# Patient Record
Sex: FEMALE | Race: WHITE | Employment: FULL TIME | URBAN - METROPOLITAN AREA
[De-identification: names, ages, dates, MRNs, and addresses within clinical notes are randomized per-mention and may not be internally consistent; named-entity substitution may affect disease eponyms.]

---

## 2017-02-27 ENCOUNTER — TRANSFERRED RECORDS (OUTPATIENT)
Dept: HEALTH INFORMATION MANAGEMENT | Facility: CLINIC | Age: 42
End: 2017-02-27

## 2017-03-02 ENCOUNTER — OFFICE VISIT (OUTPATIENT)
Dept: URGENT CARE | Facility: URGENT CARE | Age: 42
End: 2017-03-02
Payer: COMMERCIAL

## 2017-03-02 VITALS
WEIGHT: 198 LBS | OXYGEN SATURATION: 100 % | TEMPERATURE: 98.5 F | BODY MASS INDEX: 30.55 KG/M2 | SYSTOLIC BLOOD PRESSURE: 122 MMHG | HEART RATE: 102 BPM | DIASTOLIC BLOOD PRESSURE: 88 MMHG

## 2017-03-02 DIAGNOSIS — R10.84 ABDOMINAL PAIN, GENERALIZED: Primary | ICD-10-CM

## 2017-03-02 LAB
BASOPHILS # BLD AUTO: 0 10E9/L (ref 0–0.2)
BASOPHILS NFR BLD AUTO: 0.3 %
DIFFERENTIAL METHOD BLD: NORMAL
EOSINOPHIL # BLD AUTO: 0.1 10E9/L (ref 0–0.7)
EOSINOPHIL NFR BLD AUTO: 1.8 %
ERYTHROCYTE [DISTWIDTH] IN BLOOD BY AUTOMATED COUNT: 13.7 % (ref 10–15)
HCT VFR BLD AUTO: 40.6 % (ref 35–47)
HGB BLD-MCNC: 13.2 G/DL (ref 11.7–15.7)
LYMPHOCYTES # BLD AUTO: 1.5 10E9/L (ref 0.8–5.3)
LYMPHOCYTES NFR BLD AUTO: 19.6 %
MCH RBC QN AUTO: 28.4 PG (ref 26.5–33)
MCHC RBC AUTO-ENTMCNC: 32.5 G/DL (ref 31.5–36.5)
MCV RBC AUTO: 88 FL (ref 78–100)
MONOCYTES # BLD AUTO: 0.7 10E9/L (ref 0–1.3)
MONOCYTES NFR BLD AUTO: 9.6 %
NEUTROPHILS # BLD AUTO: 5.3 10E9/L (ref 1.6–8.3)
NEUTROPHILS NFR BLD AUTO: 68.7 %
PLATELET # BLD AUTO: 285 10E9/L (ref 150–450)
RBC # BLD AUTO: 4.64 10E12/L (ref 3.8–5.2)
WBC # BLD AUTO: 7.7 10E9/L (ref 4–11)

## 2017-03-02 PROCEDURE — 99214 OFFICE O/P EST MOD 30 MIN: CPT | Performed by: FAMILY MEDICINE

## 2017-03-02 PROCEDURE — 85025 COMPLETE CBC W/AUTO DIFF WBC: CPT | Performed by: FAMILY MEDICINE

## 2017-03-02 PROCEDURE — 36415 COLL VENOUS BLD VENIPUNCTURE: CPT | Performed by: FAMILY MEDICINE

## 2017-03-02 RX ORDER — SUCRALFATE 1 G/1
1 TABLET ORAL 4 TIMES DAILY
Qty: 30 TABLET | Refills: 0 | Status: ON HOLD | OUTPATIENT
Start: 2017-03-02 | End: 2017-04-23

## 2017-03-02 RX ORDER — HYDROCODONE BITARTRATE AND ACETAMINOPHEN 5; 325 MG/1; MG/1
1-2 TABLET ORAL
Status: ON HOLD | COMMUNITY
Start: 2017-02-27 | End: 2017-04-23

## 2017-03-02 NOTE — NURSING NOTE
"Chief Complaint   Patient presents with     Urgent Care     Abdominal Pain     Was seen in [facility] on 2/27 with all over stomach pain. Had CT and labs done that were all normal. Pt is still having 6/10 pain.        Initial /88  Pulse 102  Temp 98.5  F (36.9  C) (Tympanic)  Wt 198 lb (89.8 kg)  SpO2 100%  BMI 30.55 kg/m2 Estimated body mass index is 30.55 kg/(m^2) as calculated from the following:    Height as of 12/30/16: 5' 7.5\" (1.715 m).    Weight as of this encounter: 198 lb (89.8 kg).  Medication Reconciliation: complete    Sandra Pool   "

## 2017-03-02 NOTE — MR AVS SNAPSHOT
After Visit Summary   3/2/2017    Leyla Hines    MRN: [id number]           Patient Information     Date Of Birth          1975        Visit Information        Provider Department      3/2/2017 4:30 PM Corbin Gaspar MD [facility]        Today's Diagnoses     Abdominal pain, generalized    -  1      Care Instructions      *Abdominal Pain, Unknown Cause (Female)    The exact cause of your abdominal (stomach) pain is not certain. This does not mean that this is something to worry about, or the right tests were not done. Everyone likes to know the exact cause of the problem, but sometimes with abdominal pain, there is no clear-cut cause, and this could be a good thing. The good news is that your symptoms can be treated, and you will feel better.   Your condition does not seem serious now; however, sometimes the signs of a serious problem may take more time to appear. For this reason, it is important for you to watch for any new symptoms, problems, or worsening of your condition.  Over the next few days, the abdominal pain may come and go, or be continuous. Other common symptoms can include nausea and vomiting. Sometimes it can be difficult to tell if you feel nauseous, you may just feel bad and not associate that feeling with nausea. Constipation, diarrhea, and a fever may go along with the pain.  The pain may continue even if treated correctly over the following days. Depending on how things go, sometimes the cause can become clear and may require further or different treatment. Additional evaluations, medications, or tests may be needed.  Home care  Your health care provider may prescribe medications for pain, symptoms, or an infection.  Follow the health care provider's instructions for taking these medications.  General care    Rest until your next exam. No strenuous activities.    Try to find positions that ease discomfort. A small pillow placed on the abdomen may help relieve  pain.    Something warm on your abdomen (such as a heating pad) may help, but be careful not to burn yourself.  Diet    Do not force yourself to eat, especially if having cramps, vomiting, or diarrhea.    Water is important so you do not get dehydrated. Soup may also be good. Sports drinks may also help, especially if they are not too acidic. Make sure you don't drink sugary drinks as this can make things worse. Take liquids in small amounts. Do not guzzle them.    Caffeine sometimes makes the pain and cramping worse.    Avoid dairy products if you have vomiting or diarrhea.    Don't eat large amounts at a time. Wait a few minutes between bites.    Eat a diet low in fiber (called a low-residue diet). Foods allowed include refined breads, white rice, fruit and vegetable juices without pulp, tender meats. These foods will pass more easily through the intestine.    Avoid fried or fatty foods, dairy, alcohol and spicy foods until your symptoms go away.  Follow-up care  Follow up with your health care provider as instructed, or if your pain does not begin to improve in the next 24 hours.  When to seek medical care  Seek prompt medical care if any of the following occur:    Pain gets worse or moves to the right lower abdomen    New or worsening vomiting or diarrhea    Swelling of the abdomen    Unable to pass stool for more than three days    New fever over 101  F (38.3 C), or rising fever    Blood in vomit or bowel movements (dark red or black color)    Jaundice (yellow color of eyes and skin)    Weakness, dizziness    Chest, arm, back, neck or jaw pain    Unexpected vaginal bleeding or missed period  Call 911  Call emergency services if any of the following occur:    Trouble breathing    Confusion    Fainting or loss of consciousness    Rapid heart rate    Seizure    [id number] [facility], [address] [place] [address], [place] [address]. All rights reserved. This information is not intended as a substitute for  professional medical care. Always follow your healthcare professional's instructions.        What are Gallstones?  The gallbladder stores bile, a fluid made by the liver. Bile helps digest fats in the foods you eat. Gallstones form when certain substances in the bile crystallize and become solid. In some cases, the stones don t cause any symptoms. In others, they irritate the walls of the gallbladder. More serious problems can occur if stones move into nearby ducts--such as the common bile duct--and cause blockages. This can block the flow of bile and can lead to pain, nausea, and infection.    Common symptoms  Gallbladder problems can cause painful attacks, often after a meal. Some people have only one attack. Others have many. Common symptoms include:    Severe, steady pain or aching in the upper right abdomen, back, or right shoulder blade, lasting from 30 minutes to several hours    A dull ache beneath the ribs or breastbone    Nausea, upset stomach, or vomiting    Jaundice (a buildup of bile chemicals in the blood), which causes yellowing of the skin and eyes, dark urine, and itching. Call your doctor immediately if this system develops.  Treating gallstones  If your stones are not causing symptoms, you may choose to delay treatment. But if you ve had one or more painful attacks, your doctor will likely recommend removing your gallbladder. This prevents more stones from forming and causing attacks. It also helps prevent complications, such as stones passing into the ducts and causing infection or pancreatitis. After the gallbladder is removed, your liver will still make bile to aid digestion.     If you re pregnant  Hormone changes during pregnancy can make bile more likely to form stones. If your gallbladder needs to be removed, your doctor will talk with you about the timing for surgery. In some cases, it can be delayed until after childbirth. In others, you may have surgery during pregnancy. This helps  "protect you and your baby s health.        9602-8638 [employer]. [address] [place] [address], [place] [address]. All rights reserved. This information is not intended as a substitute for professional medical care. Always follow your healthcare professional's instructions.              Follow-ups after your visit        Future tests that were ordered for you today     Open Future Orders        Priority Expected Expires Ordered    H Pylori antigen, stool Routine  2017 3/2/2017            Who to contact     If you have questions or need follow up information about today's clinic visit or your schedule please contact [facility] directly at [phone].  Normal or non-critical lab and imaging results will be communicated to you by Ideaxishart, letter or phone within 4 business days after the clinic has received the results. If you do not hear from us within 7 days, please contact the clinic through Ideaxishart or phone. If you have a critical or abnormal lab result, we will notify you by phone as soon as possible.  Submit refill requests through Netseer or call your pharmacy and they will forward the refill request to us. Please allow 3 business days for your refill to be completed.          Additional Information About Your Visit        Ideaxishar[employer] Information     Netseer lets you send messages to your doctor, view your test results, renew your prescriptions, schedule appointments and more. To sign up, go to [email or web address] . Click on \"Log in\" on the left side of the screen, which will take you to the Welcome page. Then click on \"Sign up Now\" on the right side of the page.     You will be asked to enter the access code listed below, as well as some personal information. Please follow the directions to create your username and password.     Your access code is: [email or web address]  Expires: 2017  5:23 PM     Your access code will  in 90 days. If you need help or a new code, please call your " [facility] or [phone].        Care EveryWhere ID     This is your Care EveryWhere ID. This could be used by other organizations to access your [facility] medical records  [id number]        Your Vitals Were     Pulse Temperature Pulse Oximetry BMI (Body Mass Index)          102 98.5  F (36.9  C) (Tympanic) 100% 30.55 kg/m2         Blood Pressure from Last 3 Encounters:   03/02/17 122/88   12/30/16 112/78   11/25/16 122/72    Weight from Last 3 Encounters:   03/02/17 198 lb (89.8 kg)   12/30/16 180 lb (81.6 kg)   11/25/16 200 lb 4.8 oz (90.9 kg)              We Performed the Following     CBC with platelets and differential          Today's Medication Changes          These changes are accurate as of: 3/2/17  5:23 PM.  If you have any questions, ask your nurse or doctor.               Start taking these medicines.        Dose/Directions    sucralfate 1 GM tablet   Commonly known as:  CARAFATE   Used for:  Abdominal pain, generalized   Started by:  Corbin Gaspar MD        Dose:  1 g   Take 1 tablet (1 g) by mouth 4 times daily   Quantity:  30 tablet   Refills:  0            Where to get your medicines      [facility] [address] [place], [place] [address] AT [address]  [address] [facility] [address]     Phone:  [phone]     sucralfate 1 GM tablet                Primary Care Provider Office Phone # Fax #    REESE Sylvester [phone]       [facility] [place] [address] [place] [address]        Thank you!     Thank you for choosing [facility]  for your care. Our goal is always to provide you with excellent care. Hearing back from our patients is one way we can continue to improve our services. Please take a few minutes to complete the written survey that you may receive in the mail after your visit with us. Thank you!             Your Updated Medication  List - Protect others around you: Learn how to safely use, store and throw away your medicines at www.disposemymeds.org.          This list is accurate as of: 3/2/17  5:23 PM.  Always use your most recent med list.                   Brand Name Dispense Instructions for use    HYDROcodone-acetaminophen 5-325 MG per tablet    NORCO     Take 1-2 tablets by mouth       multivitamin, therapeutic with minerals Tabs tablet      Take 1 tablet by mouth daily.       sucralfate 1 GM tablet    CARAFATE    30 tablet    Take 1 tablet (1 g) by mouth 4 times daily

## 2017-03-02 NOTE — PROGRESS NOTES
SUBJECTIVE  HPI:  Leyla Hines is a 41 year old female who presents with the CC of abdominal pain.    Patient complain of abdominal pain for the past 5 days, woke up with pain.  Pain in epigastric and periumbilical, diffusely.  Patient states that is constant ache and fullness.  Patient was seen in [facility] on Monday (the following day) and had CT scan and labs and was told normal.  Patient states that new change in symptoms is that stools are more light tan yellowish color, loose.  Denies any vomiting.  Rates pain 5/10.    Patient underwent colonoscopy in December due to family history of colon cancer before age 50 and was normal.  Denies any recent travel, no changes in food.    Patient states that has tried zantac and prilosec without relief.  Still has appendix and gallbladder.      No past medical history on file.  Current Outpatient Prescriptions   Medication Sig Dispense Refill     HYDROcodone-acetaminophen (NORCO) 5-325 MG per tablet Take 1-2 tablets by mouth       multivitamin, therapeutic with minerals (THERA-VIT-M) TABS Take 1 tablet by mouth daily.       Social History   Substance Use Topics     Smoking status: Never Smoker     Smokeless tobacco: Never Used     Alcohol use No       ROS:  CONSTITUTIONAL:NEGATIVE for fever, chills, change in weight  INTEGUMENTARY/SKIN: NEGATIVE for worrisome rashes, moles or lesions  ENT/MOUTH: NEGATIVE for ear, mouth and throat problems  RESP:NEGATIVE for significant cough or SOB  CV: NEGATIVE for chest pain, palpitations or peripheral edema  GI: POSITIVE for abdominal pain  : negative for, dysuria and frequency   MUSCULOSKELETAL: NEGATIVE for significant arthralgias or myalgia    OBJECTIVE:  /88  Pulse 102  Temp 98.5  F (36.9  C) (Tympanic)  Wt 198 lb (89.8 kg)  SpO2 100%  BMI 30.55 kg/m2  GENERAL APPEARANCE: healthy, alert and no distress  EYES: EOMI,  PERRL, conjunctiva clear  HENT: ear canals and TM's normal.  Nose and mouth without ulcers, erythema or  lesions  NECK: supple, mild tenderness in RUQ, RLQ, epigastric, no rebound, no guarding, no lymphadenopathy  RESP: lungs clear to auscultation - no rales, rhonchi or wheezes  CV: regular rates and rhythm, normal S1 S2, no murmur noted  ABDOMEN:  soft, nontender, no HSM or masses and bowel sounds normal  NEURO: Normal strength and tone, sensory exam grossly normal,  normal speech and mentation  SKIN: no suspicious lesions or rashes  PSYCH: mentation appears normal and affect normal/bright    Results for orders placed or performed in visit on 03/02/17   CBC with platelets and differential   Result Value Ref Range    WBC 7.7 4.0 - 11.0 10e9/L    RBC Count 4.64 3.8 - 5.2 10e12/L    Hemoglobin 13.2 11.7 - 15.7 g/dL    Hematocrit 40.6 35.0 - 47.0 %    MCV 88 78 - 100 fl    MCH 28.4 26.5 - 33.0 pg    MCHC 32.5 31.5 - 36.5 g/dL    RDW 13.7 10.0 - 15.0 %    Platelet Count 285 150 - 450 10e9/L    Diff Method Automated Method     % Neutrophils 68.7 %    % Lymphocytes 19.6 %    % Monocytes 9.6 %    % Eosinophils 1.8 %    % Basophils 0.3 %    Absolute Neutrophil 5.3 1.6 - 8.3 10e9/L    Absolute Lymphocytes 1.5 0.8 - 5.3 10e9/L    Absolute Monocytes 0.7 0.0 - 1.3 10e9/L    Absolute Eosinophils 0.1 0.0 - 0.7 10e9/L    Absolute Basophils 0.0 0.0 - 0.2 10e9/L       ASSESSMENT/PLAN:  (R10.84) Abdominal pain, generalized  (primary encounter diagnosis)  Comment: unclear  Plan: CBC with platelets and differential, H Pylori         antigen, stool, sucralfate (CARAFATE) 1 GM         tablet            Reviewed with patient reassuring CBC results, discussed that most likely will require further workup thru primary provider.  Discussed possible etiology for abdominal pain, does not appear in acute distress and abdomen is not acute surgical at this time.  Will obtain H.pylori stool testing.  Trial of sucralfate to see if this will help possible gastritis.  To ER if develops worsening abdominal pain.    Follow up with primary in 1-2  days.    Corbin Gaspar MD

## 2017-03-02 NOTE — PATIENT INSTRUCTIONS
*Abdominal Pain, Unknown Cause (Female)    The exact cause of your abdominal (stomach) pain is not certain. This does not mean that this is something to worry about, or the right tests were not done. Everyone likes to know the exact cause of the problem, but sometimes with abdominal pain, there is no clear-cut cause, and this could be a good thing. The good news is that your symptoms can be treated, and you will feel better.   Your condition does not seem serious now; however, sometimes the signs of a serious problem may take more time to appear. For this reason, it is important for you to watch for any new symptoms, problems, or worsening of your condition.  Over the next few days, the abdominal pain may come and go, or be continuous. Other common symptoms can include nausea and vomiting. Sometimes it can be difficult to tell if you feel nauseous, you may just feel bad and not associate that feeling with nausea. Constipation, diarrhea, and a fever may go along with the pain.  The pain may continue even if treated correctly over the following days. Depending on how things go, sometimes the cause can become clear and may require further or different treatment. Additional evaluations, medications, or tests may be needed.  Home care  Your health care provider may prescribe medications for pain, symptoms, or an infection.  Follow the health care provider's instructions for taking these medications.  General care    Rest until your next exam. No strenuous activities.    Try to find positions that ease discomfort. A small pillow placed on the abdomen may help relieve pain.    Something warm on your abdomen (such as a heating pad) may help, but be careful not to burn yourself.  Diet    Do not force yourself to eat, especially if having cramps, vomiting, or diarrhea.    Water is important so you do not get dehydrated. Soup may also be good. Sports drinks may also help, especially if they are not too acidic. Make sure you  don't drink sugary drinks as this can make things worse. Take liquids in small amounts. Do not guzzle them.    Caffeine sometimes makes the pain and cramping worse.    Avoid dairy products if you have vomiting or diarrhea.    Don't eat large amounts at a time. Wait a few minutes between bites.    Eat a diet low in fiber (called a low-residue diet). Foods allowed include refined breads, white rice, fruit and vegetable juices without pulp, tender meats. These foods will pass more easily through the intestine.    Avoid fried or fatty foods, dairy, alcohol and spicy foods until your symptoms go away.  Follow-up care  Follow up with your health care provider as instructed, or if your pain does not begin to improve in the next 24 hours.  When to seek medical care  Seek prompt medical care if any of the following occur:    Pain gets worse or moves to the right lower abdomen    New or worsening vomiting or diarrhea    Swelling of the abdomen    Unable to pass stool for more than three days    New fever over 101  F (38.3 C), or rising fever    Blood in vomit or bowel movements (dark red or black color)    Jaundice (yellow color of eyes and skin)    Weakness, dizziness    Chest, arm, back, neck or jaw pain    Unexpected vaginal bleeding or missed period  Call 911  Call emergency services if any of the following occur:    Trouble breathing    Confusion    Fainting or loss of consciousness    Rapid heart rate    Seizure    [id number] [employer] [facility], [address] [place] [address], [place] [address]. All rights reserved. This information is not intended as a substitute for professional medical care. Always follow your healthcare professional's instructions.        What are Gallstones?  The gallbladder stores bile, a fluid made by the liver. Bile helps digest fats in the foods you eat. Gallstones form when certain substances in the bile crystallize and become solid. In some cases, the stones don t cause any symptoms. In others, they  irritate the walls of the gallbladder. More serious problems can occur if stones move into nearby ducts--such as the common bile duct--and cause blockages. This can block the flow of bile and can lead to pain, nausea, and infection.    Common symptoms  Gallbladder problems can cause painful attacks, often after a meal. Some people have only one attack. Others have many. Common symptoms include:    Severe, steady pain or aching in the upper right abdomen, back, or right shoulder blade, lasting from 30 minutes to several hours    A dull ache beneath the ribs or breastbone    Nausea, upset stomach, or vomiting    Jaundice (a buildup of bile chemicals in the blood), which causes yellowing of the skin and eyes, dark urine, and itching. Call your doctor immediately if this system develops.  Treating gallstones  If your stones are not causing symptoms, you may choose to delay treatment. But if you ve had one or more painful attacks, your doctor will likely recommend removing your gallbladder. This prevents more stones from forming and causing attacks. It also helps prevent complications, such as stones passing into the ducts and causing infection or pancreatitis. After the gallbladder is removed, your liver will still make bile to aid digestion.     If you re pregnant  Hormone changes during pregnancy can make bile more likely to form stones. If your gallbladder needs to be removed, your doctor will talk with you about the timing for surgery. In some cases, it can be delayed until after childbirth. In others, you may have surgery during pregnancy. This helps protect you and your baby s health.        [id number] [employer]. [address] [place] [address], [place] [address]. All rights reserved. This information is not intended as a substitute for professional medical care. Always follow your healthcare professional's instructions.

## 2017-03-03 ENCOUNTER — HOSPITAL ENCOUNTER (OUTPATIENT)
Dept: ULTRASOUND IMAGING | Facility: CLINIC | Age: 42
Discharge: HOME | End: 2017-03-03
Attending: INTERNAL MEDICINE | Admitting: INTERNAL MEDICINE
Payer: COMMERCIAL

## 2017-03-03 ENCOUNTER — OFFICE VISIT (OUTPATIENT)
Dept: PEDIATRICS | Facility: CLINIC | Age: 42
End: 2017-03-03
Payer: COMMERCIAL

## 2017-03-03 VITALS
TEMPERATURE: 98.3 F | HEART RATE: 98 BPM | WEIGHT: 194.44 LBS | RESPIRATION RATE: 16 BRPM | HEIGHT: 67 IN | OXYGEN SATURATION: 100 % | DIASTOLIC BLOOD PRESSURE: 86 MMHG | SYSTOLIC BLOOD PRESSURE: 120 MMHG | BODY MASS INDEX: 30.52 KG/M2

## 2017-03-03 DIAGNOSIS — R10.84 ABDOMINAL PAIN, GENERALIZED: ICD-10-CM

## 2017-03-03 DIAGNOSIS — R10.84 ABDOMINAL PAIN, GENERALIZED: Primary | ICD-10-CM

## 2017-03-03 LAB — LIPASE SERPL-CCNC: 73 U/L (ref 73–393)

## 2017-03-03 PROCEDURE — 80053 COMPREHEN METABOLIC PANEL: CPT | Performed by: INTERNAL MEDICINE

## 2017-03-03 PROCEDURE — 36415 COLL VENOUS BLD VENIPUNCTURE: CPT | Performed by: INTERNAL MEDICINE

## 2017-03-03 PROCEDURE — 99214 OFFICE O/P EST MOD 30 MIN: CPT | Performed by: INTERNAL MEDICINE

## 2017-03-03 PROCEDURE — 76700 US EXAM ABDOM COMPLETE: CPT

## 2017-03-03 PROCEDURE — 83690 ASSAY OF LIPASE: CPT | Performed by: INTERNAL MEDICINE

## 2017-03-03 NOTE — LETTER
[facility]  [address] [address]                  [phone]   March 6, 2017    Leyla Hines  [address] [place] [id number]      Dear Leyla,    Here is a summary of your recent test results:    Your repeat electrolytes, liver, and kidney panels and you pancreas test looked great.       Your test results are enclosed.      Please contact me if you have any questions.           Thank you very much for choosing [facility]    Best regards,    Yaakov Stephens MD        Results for orders placed or performed in visit on 03/03/17   Comprehensive metabolic panel (BMP + Alb, Alk Phos, ALT, AST, Total. Bili, TP)   Result Value Ref Range    Sodium 139 133 - 144 mmol/L    Potassium 3.8 3.4 - 5.3 mmol/L    Chloride 105 94 - 109 mmol/L    Carbon Dioxide 27 20 - 32 mmol/L    Anion Gap 7 3 - 14 mmol/L    Glucose 88 70 - 99 mg/dL    Urea Nitrogen 6 (L) 7 - 30 mg/dL    Creatinine 0.62 0.52 - 1.04 mg/dL    GFR Estimate >90  Non  GFR Calc   >60 mL/min/1.7m2    GFR Estimate If Black >90   GFR Calc   >60 mL/min/1.7m2    Calcium 8.9 8.5 - 10.1 mg/dL    Bilirubin Total 0.3 0.2 - 1.3 mg/dL    Albumin 3.8 3.4 - 5.0 g/dL    Protein Total 7.4 6.8 - 8.8 g/dL    Alkaline Phosphatase 67 40 - 150 U/L    ALT 54 (H) 0 - 50 U/L    AST 23 0 - 45 U/L   Lipase   Result Value Ref Range    Lipase 73 73 - 393 U/L

## 2017-03-03 NOTE — PATIENT INSTRUCTIONS
Clear liquid diet for the next 3 days.    Get ultrasound today hopefully.    Conisder an upper scope the ultrasound is negative.    Lab work downstairs today.  (Go down the main stairs/elevator and re-enter the main part of the building on the first floor.  On the right hand side--with striped wallpaper--is the lab and xray waiting area. Give them your name at the window there and wait for them to call your name.)     Yaakov Stephens MD  Internal Medicine and Pediatrics

## 2017-03-03 NOTE — PROGRESS NOTES
"  SUBJECTIVE:                                                    Leyla Hines is a 41 year old female who presents to clinic today for the following health issues:      ED/UC Followup:    Facility:  [facility]   Date of visit: 3/2/17  Reason for visit: abdominal pain   Current Status: Pt states \"today has been ok\" pain level is at 4/10 currently, at worst 8/10.        Began about 5 days ago with intense pain and fullness in central abdomen.  Had also just started menses at that point, so assumed it was this; too ibuprofen.  Went to urgent care and had a CT, blood work, all within normal limits could not see much on CT:  Given proton pump inhibitor and H2 blocker.   Watched it for a few days and then yesterday seen at our urgent care.      Since  when went to urgency room, stools have been light tan;   Overall, symptoms are better than , but still not good.      Symptoms are always present.  No vomiting, but some nausea. No fevers.  No sore throat, no earache, no headache.      Symptoms do worsen some after eating, but has not been eating much, due to lack of appetite.     Problem list and histories reviewed & adjusted, as indicated.  Additional history: as documented    Patient Active Problem List   Diagnosis     PE (pulmonary embolism)     Past Surgical History   Procedure Laterality Date      section  02/10/07     high BP      section  09     repeat      section  2011     Procedure: SECTION; Surgeon:ANDREI RUSSELL; Location: L+D     Colonoscopy N/A 2016     Procedure: COLONOSCOPY;  Surgeon: Lester Looney MD;  Location:  GI       Social History   Substance Use Topics     Smoking status: Never Smoker     Smokeless tobacco: Never Used     Alcohol use No     Family History   Problem Relation Age of Onset     Hyperlipidemia Mother      Other Cancer Mother      lung cancer cause of death at 63     Thyroid Disease Mother      hyperthyroidism     " "OSTEOPOROSIS Mother      Coronary Artery Disease Father      Myocardial Infarction Father      Colon Cancer Father      about age 54     Other Cancer Father      esophageal cause of death at 67     Hypertension Father      Hyperlipidemia Father      Hypertension Brother      Asthma Brother      Colon Cancer Brother      2015     Emphysema Maternal Grandmother      Other Cancer Maternal Grandfather      lymphoma cause of death at age 54     Breast Cancer Paternal Grandmother      in 80s     Alzheimer Disease Paternal Grandmother      DIABETES Paternal Grandfather      Colon Cancer Paternal Grandfather      dx in 80s     Colon Cancer Paternal Uncle          Current Outpatient Prescriptions   Medication Sig Dispense Refill     HYDROcodone-acetaminophen (NORCO) 5-325 MG per tablet Take 1-2 tablets by mouth       sucralfate (CARAFATE) 1 GM tablet Take 1 tablet (1 g) by mouth 4 times daily 30 tablet 0     multivitamin, therapeutic with minerals (THERA-VIT-M) TABS Take 1 tablet by mouth daily.       Allergies   Allergen Reactions     Betadine [Povidone Iodine]      BP Readings from Last 3 Encounters:   03/03/17 120/86   03/02/17 122/88   12/30/16 112/78    Wt Readings from Last 3 Encounters:   03/03/17 194 lb 7 oz (88.2 kg)   03/02/17 198 lb (89.8 kg)   12/30/16 180 lb (81.6 kg)                  Labs reviewed in EPIC    Reviewed and updated as needed this visit by clinical staff       Reviewed and updated as needed this visit by Provider         ROS:  C: NEGATIVE for fever, chills, change in weight  E/M: NEGATIVE for ear, mouth and throat problems  R: NEGATIVE for significant cough or SOB  CV: NEGATIVE for chest pain, palpitations or peripheral edema    OBJECTIVE:                                                    /86 (BP Location: Right arm, Patient Position: Chair, Cuff Size: Adult Regular)  Pulse 98  Temp 98.3  F (36.8  C) (Tympanic)  Resp 16  Ht 5' 7.25\" (1.708 m)  Wt 194 lb 7 oz (88.2 kg)  SpO2 100%  " Breastfeeding? No  BMI 30.23 kg/m2  Body mass index is 30.23 kg/(m^2).   GENERAL: healthy, alert, well nourished, well hydrated, no distress  HENT: ear canals- normal; TMs- normal; Nose- normal; Mouth- no ulcers, no lesions  NECK: no tenderness, no adenopathy, no asymmetry, no masses, no stiffness; thyroid- normal to palpation  RESP: lungs clear to auscultation - no rales, no rhonchi, no wheezes  CV: regular rates and rhythm, normal S1 S2, no S3 or S4 and no murmur, no click or rub -  ABDOMEN: mild tenderness, but very soft no  hepatosplenomegaly, no masses, normal bowel sounds    Diagnostic test results:  Diagnostic Test Results:  none      ASSESSMENT/PLAN:                                                    1. Abdominal pain, generalized  Has already had a negative CT, electrolytes, liver, and kidney panels, and lipase.  complete blood count has returned to normal. Proceed with sono abd for ruling out gall bladder obstruction.  If negative, and if symptoms persist, would then need an esophagogastroduodenoscopy.  Continue proton pump inhibitor for now.   - US Abdomen Complete; Future  - Comprehensive metabolic panel (BMP + Alb, Alk Phos, ALT, AST, Total. Bili, TP)  - Lipase    E4: Spent 25 minutes face to face.     More than 50% of the time was spent in counseling and coordination of care of these issues.   See Patient Instructions    Yaakov Stephens MD  [facility]

## 2017-03-03 NOTE — MR AVS SNAPSHOT
After Visit Summary   3/3/2017    Leyla Hines    MRN: [id number]           Patient Information     Date Of Birth          1975        Visit Information        Provider Department      3/3/2017 11:20 AM [facility]akov [facility] MD [facility]        Today's Diagnoses     Abdominal pain, generalized    -  1      Care Instructions    Clear liquid diet for the next 3 days.    Get ultrasound today hopefully.    Conisder an upper scope the ultrasound is negative.    Lab work downstairs today.  (Go down the main stairs/elevator and re-enter the main part of the building on the first floor.  On the right hand side--with striped wallpaper--is the lab and xray waiting area. Give them your name at the window there and wait for them to call your name.)     Yaakov Stephens MD  Internal Medicine and Pediatrics           Follow-ups after your visit        Future tests that were ordered for you today     Open Future Orders        Priority Expected Expires Ordered    US Abdomen Complete Routine  3/3/2018 3/3/2017    H Pylori antigen, stool Routine  4/1/2017 3/2/2017            Who to contact     If you have questions or need follow up information about today's clinic visit or your schedule please contact [facility] directly at [phone].  Normal or non-critical lab and imaging results will be communicated to you by MyChart, letter or phone within 4 business days after the clinic has received the results. If you do not hear from us within 7 days, please contact the clinic through [employer] or phone. If you have a critical or abnormal lab result, we will notify you by phone as soon as possible.  Submit refill requests through [employer] or call your pharmacy and they will forward the refill request to us. Please allow 3 business days for your refill to be completed.          Additional Information About Your Visit        [employer] Some Style Information     [employer] lets you send messages to your doctor, view your test  "results, renew your prescriptions, schedule appointments and more. To sign up, go to [email or web address] . Click on \"Log in\" on the left side of the screen, which will take you to the Welcome page. Then click on \"Sign up Now\" on the right side of the page.     You will be asked to enter the access code listed below, as well as some personal information. Please follow the directions to create your username and password.     Your access code is: [email or web address]  Expires: 2017  5:23 PM     Your access code will  in 90 days. If you need help or a new code, please call your [place] clinic or [phone].        Care EveryWhere ID     This is your Care EveryWhere ID. This could be used by other organizations to access your [place] medical records  [id number]        Your Vitals Were     Pulse Temperature Respirations Height Pulse Oximetry Breastfeeding?    98 98.3  F (36.8  C) (Tympanic) 16 5' 7.25\" (1.708 m) 100% No    BMI (Body Mass Index)                   30.23 kg/m2            Blood Pressure from Last 3 Encounters:   17 120/86   17 122/88   16 112/78    Weight from Last 3 Encounters:   17 194 lb 7 oz (88.2 kg)   17 198 lb (89.8 kg)   16 180 lb (81.6 kg)              We Performed the Following     Comprehensive metabolic panel (BMP + Alb, Alk Phos, ALT, AST, Total. Bili, TP)     Lipase        Primary Care Provider Office Phone # Fax #    [facility] [phone]       [facility] [address] [place] [address]        Thank you!     Thank you for choosing [facility]  for your care. Our goal is always to provide you with excellent care. Hearing back from our patients is one way we can continue to improve our services. Please take a few minutes to complete the written survey that you may receive in the mail after your visit with us. Thank you!             Your Updated Medication List - Protect others " around you: Learn how to safely use, store and throw away your medicines at www.disposemymeds.org.          This list is accurate as of: 3/3/17 11:45 AM.  Always use your most recent med list.                   Brand Name Dispense Instructions for use    HYDROcodone-acetaminophen 5-325 MG per tablet    NORCO     Take 1-2 tablets by mouth       multivitamin, therapeutic with minerals Tabs tablet      Take 1 tablet by mouth daily.       sucralfate 1 GM tablet    CARAFATE    30 tablet    Take 1 tablet (1 g) by mouth 4 times daily

## 2017-03-03 NOTE — NURSING NOTE
"Chief Complaint   Patient presents with     ER F/U       Initial /86 (BP Location: Right arm, Patient Position: Chair, Cuff Size: Adult Regular)  Pulse 98  Temp 98.3  F (36.8  C) (Tympanic)  Resp 16  Ht 5' 7.25\" (1.708 m)  Wt 194 lb 7 oz (88.2 kg)  SpO2 100%  Breastfeeding? No  BMI 30.23 kg/m2 Estimated body mass index is 30.23 kg/(m^2) as calculated from the following:    Height as of this encounter: 5' 7.25\" (1.708 m).    Weight as of this encounter: 194 lb 7 oz (88.2 kg).  Medication Reconciliation: complete     Verna James MA 3/3/2017 11:28 AM    "

## 2017-03-04 LAB
ALBUMIN SERPL-MCNC: 3.8 G/DL (ref 3.4–5)
ALP SERPL-CCNC: 67 U/L (ref 40–150)
ALT SERPL W P-5'-P-CCNC: 54 U/L (ref 0–50)
ANION GAP SERPL CALCULATED.3IONS-SCNC: 7 MMOL/L (ref 3–14)
AST SERPL W P-5'-P-CCNC: 23 U/L (ref 0–45)
BILIRUB SERPL-MCNC: 0.3 MG/DL (ref 0.2–1.3)
BUN SERPL-MCNC: 6 MG/DL (ref 7–30)
CALCIUM SERPL-MCNC: 8.9 MG/DL (ref 8.5–10.1)
CHLORIDE SERPL-SCNC: 105 MMOL/L (ref 94–109)
CO2 SERPL-SCNC: 27 MMOL/L (ref 20–32)
CREAT SERPL-MCNC: 0.62 MG/DL (ref 0.52–1.04)
GFR SERPL CREATININE-BSD FRML MDRD: ABNORMAL ML/MIN/1.7M2
GLUCOSE SERPL-MCNC: 88 MG/DL (ref 70–99)
POTASSIUM SERPL-SCNC: 3.8 MMOL/L (ref 3.4–5.3)
PROT SERPL-MCNC: 7.4 G/DL (ref 6.8–8.8)
SODIUM SERPL-SCNC: 139 MMOL/L (ref 133–144)

## 2017-03-06 ENCOUNTER — TELEPHONE (OUTPATIENT)
Dept: PEDIATRICS | Facility: CLINIC | Age: 42
End: 2017-03-06

## 2017-03-06 DIAGNOSIS — R10.84 ABDOMINAL PAIN, GENERALIZED: ICD-10-CM

## 2017-03-06 PROCEDURE — 87338 HPYLORI STOOL AG IA: CPT | Performed by: FAMILY MEDICINE

## 2017-03-06 NOTE — TELEPHONE ENCOUNTER
Patient calling back with update. States it is persisting. Not dramatically worse but no better. Dropped off the h. Pylori this am.  Requesting the order for the UGI be placed. [phone] ok to radha.  Татьяна Gonzales RN

## 2017-03-06 NOTE — TELEPHONE ENCOUNTER
Already place.  She should hear from them early this week.   Call us back by Wed if they have not contacted her.    Yaakov tSephens MD  Internal Medicine and Pediatrics

## 2017-03-07 LAB
H PYLORI AG STL QL IA: NORMAL
MICRO REPORT STATUS: NORMAL
SPECIMEN SOURCE: NORMAL

## 2017-03-09 ENCOUNTER — HOSPITAL ENCOUNTER (OUTPATIENT)
Facility: CLINIC | Age: 42
Discharge: HOME | End: 2017-03-09
Attending: INTERNAL MEDICINE | Admitting: INTERNAL MEDICINE
Payer: COMMERCIAL

## 2017-03-09 VITALS
OXYGEN SATURATION: 99 % | DIASTOLIC BLOOD PRESSURE: 70 MMHG | SYSTOLIC BLOOD PRESSURE: 110 MMHG | RESPIRATION RATE: 11 BRPM

## 2017-03-09 LAB — UPPER GI ENDOSCOPY: NORMAL

## 2017-03-09 PROCEDURE — 25000125 ZZHC RX 250: Performed by: INTERNAL MEDICINE

## 2017-03-09 PROCEDURE — 25000128 H RX IP 250 OP 636: Performed by: INTERNAL MEDICINE

## 2017-03-09 PROCEDURE — 40000104 ZZH STATISTIC MODERATE SEDATION < 10 MIN: Performed by: INTERNAL MEDICINE

## 2017-03-09 PROCEDURE — 88305 TISSUE EXAM BY PATHOLOGIST: CPT | Performed by: INTERNAL MEDICINE

## 2017-03-09 PROCEDURE — 43239 EGD BIOPSY SINGLE/MULTIPLE: CPT | Performed by: INTERNAL MEDICINE

## 2017-03-09 PROCEDURE — 25000132 ZZH RX MED GY IP 250 OP 250 PS 637: Performed by: INTERNAL MEDICINE

## 2017-03-09 PROCEDURE — 88305 TISSUE EXAM BY PATHOLOGIST: CPT | Mod: 26 | Performed by: INTERNAL MEDICINE

## 2017-03-09 RX ORDER — FLUMAZENIL 0.1 MG/ML
0.2 INJECTION, SOLUTION INTRAVENOUS
Status: DISCONTINUED | OUTPATIENT
Start: 2017-03-09 | End: 2017-03-09 | Stop reason: HOSPADM

## 2017-03-09 RX ORDER — LIDOCAINE 40 MG/G
CREAM TOPICAL
Status: DISCONTINUED | OUTPATIENT
Start: 2017-03-09 | End: 2017-03-09 | Stop reason: HOSPADM

## 2017-03-09 RX ORDER — ONDANSETRON 2 MG/ML
4 INJECTION INTRAMUSCULAR; INTRAVENOUS EVERY 6 HOURS PRN
Status: DISCONTINUED | OUTPATIENT
Start: 2017-03-09 | End: 2017-03-09 | Stop reason: HOSPADM

## 2017-03-09 RX ORDER — ONDANSETRON 4 MG/1
4 TABLET, ORALLY DISINTEGRATING ORAL EVERY 6 HOURS PRN
Status: DISCONTINUED | OUTPATIENT
Start: 2017-03-09 | End: 2017-03-09 | Stop reason: HOSPADM

## 2017-03-09 RX ORDER — NALOXONE HYDROCHLORIDE 0.4 MG/ML
.1-.4 INJECTION, SOLUTION INTRAMUSCULAR; INTRAVENOUS; SUBCUTANEOUS
Status: DISCONTINUED | OUTPATIENT
Start: 2017-03-09 | End: 2017-03-09 | Stop reason: HOSPADM

## 2017-03-09 RX ORDER — ONDANSETRON 2 MG/ML
4 INJECTION INTRAMUSCULAR; INTRAVENOUS
Status: DISCONTINUED | OUTPATIENT
Start: 2017-03-09 | End: 2017-03-09 | Stop reason: HOSPADM

## 2017-03-09 RX ORDER — FENTANYL CITRATE 50 UG/ML
INJECTION, SOLUTION INTRAMUSCULAR; INTRAVENOUS PRN
Status: DISCONTINUED | OUTPATIENT
Start: 2017-03-09 | End: 2017-03-09 | Stop reason: HOSPADM

## 2017-03-09 NOTE — DISCHARGE INSTRUCTIONS
Understanding H. pylori and Ulcers  Traditionally, ulcers, or sores in the lining of your digestive tract, were thought to be caused by too much spicy food, stress, or an anxious personality. We now know that most ulcers are probably due to infection with bacteria known as Helicobacter pylori (H. pylori).     H. pylori invade and disturb the lining of the digestive tract. Acid may weaken the area, causing an ulcer.      Common Ulcer Symptoms  Burning, cramping, or hunger-like pain in the stomach area, often one to three hours after a meal or in the middle of the night  Pain that gets better or worse with eating  Nausea or vomiting  Black, tarry, or bloody stools (which means the ulcer is bleeding)  Or you may have no symptoms.     An ulcer can form in two areas of the digestive tract; the stomach and the duodenum (where the stomach meets the small intestine).      Your Evaluation  An evaluation by your doctor can show if you have an ulcer and determine whether it was caused by H. pylori. Your doctor may ask you questions, examine you, and possibly do some tests. These may include:  A special X-ray called a barium upper gastrointestinal series, to help locate an ulcer. During the test, you drink a chalky liquid. This liquid helps the ulcer show up on the X-ray.  An endoscopic exam, done with a long tube passed through your mouth into your stomach, to give the doctor a closer look at your ulcer. You will be lightly sedated for this procedure. Your doctor can also take a tissue sample to test for H. pylori.  Blood, stool, and breath tests are also available to show whether you have H. pylori in your digestive tract.  Your Treatment  To kill H. pylori so your ulcer can heal, your doctor will probably prescribe antibiotics. Other ulcer medications that help reduce stomach acid may also be prescribed as well. Testing after treatment is recommended to be sure the H. pylori infection is gone. Usually, killing H. pylori  helps keep the ulcer from returning.    [id number] Sanjiv Chandler, [address] [place] [address], [place] [address]. All rights reserved. This information is not intended as a substitute for professional medical care. Always follow your healthcare professional's instructions.

## 2017-03-09 NOTE — LETTER
March 6, 2017        Dear Leyla,    Thank you for choosing [facility].  You are scheduled for the following service (s).    Name:  Leyla  Procedure:   Esophagogastroduodenoscopy (EGD)  Doctor: Dr. Looney  Date:              Thursday March 9, 2017   Arrival Time:   0700  *check in at Emergency/Endoscopy desk*  Procedure Time:   0730    Location:   [facility]      Endoscopy Department, First Floor (Enter through ER Doors) *       [address] [place], [place] [address]    [phone] or [facility] Service to reschedule [phone]       Upper Endoscopy (EGD)  PRE-PROCEDURE CHECKLIST  If you have diabetes, ask your regular doctor for diet and medication restrictions.  If you take a medication to thin your blood (such as Coumadin, Plavix or Lovenox) and have not already discussed this please call your primary doctor to advise on holding this medication  If you take aspirin, you may continue to do so.  If you are or may be pregnant, please discuss the risks and benefits of this procedure with your doctor.  You must arrange for a ride for the day of your exam. If you fail to arrange transportation with a responsible adult (someone you know and trust )your procedure will need to be cancelled and rescheduled.  If you must cancel or reschedule your appointment, please call [phone]  PREPARATION  To ensure a successful exam, please follow all instructions carefully. Failure to accurately and completely prepare for your exam may result in the need for an additional procedure and both procedures will be billed to your insurance.     The night before your exam:    Stop eating solid foods at 11:45 pm.     Clear liquids are okay to drink (examples: water, Gatorade, clear broth and apple juice).     Do not drink red liquids or alcoholic beverages.  The day of your exam:    Stop drinking clear liquids 4 hours before your exam     You may take your usual medications with 4 oz.  of water at least 4 hours prior to your procedure.  When you leave for the procedure:    Bring a list of all of your current medications, including any allergy or over-the-counter medications.    Bring a photo ID as well as up-to-date insurance information, such as your insurance card and any referral forms that might be required by your payer.      Upper Endoscopy (EGD)  What is an upper endoscopy?  An upper endoscopy is a test performed to evaluate symptoms of persistent upper abdominal pain, bleeding, nausea, vomiting or difficulty swallowing. During the procedure, a doctor examines the lining of your esophagus, stomach and the first part of your small intestine through a thin, flexible tube called an endoscope. If growths or other abnormalities are found during the procedure, the doctor may remove the abnormal tissue for further examination, or biopsy. An upper endoscopy may also be used to treat various conditions of the upper gastrointestinal (GI) tract, such as narrowing, abnormal growths or bleeding.  What happens during an upper endoscopy?  Plan to spend up to two hours at the endoscopy center the day of your procedure. The exam itself takes about 15 minutes to complete.   Before the exam:    You will change into a gown and robe.     Your medical history will be reviewed with you and you will be given a consent form to sign.     A nurse will insert an intravenous (IV) line into your hand or arm.  During the exam:    Medicine will be given through the IV line to help you relax and feel drowsy.     Your heart rate and oxygen levels will be monitored. If your blood pressure is low, you may be given fluids through the IV line.     The doctor will insert a flexible, hollow tube - called an endoscope - into your mouth and will advance it slowly through the esophagus, stomach and duodenum (the first part of your small intestine).     You may have a feeling of pressure or fullness.     If you have difficulty  swallowing, and the doctor finds a narrowing in your esophagus, it may be possible for the area to be expanded during the exam.     If abnormal tissue is found, the doctor may remove it through the endoscope for closer examination, or biopsy. Tissue removal is painless.  What happens after the exam?    The doctor will talk with you about the initial results of your exam.     The doctor will prepare a full report for the physician who referred you for the upper endoscopy.     You may feel bloated after the procedure. This is normal.     Your throat may feel sore for a short time.     Medication given during the exam will prohibit you from driving for the rest of the day.     Following the exam, you may resume your normal diet. Avoid alcohol until the next day.     You may resume your regular activities the day after the procedure.     A nurse will provide you with complete discharge instructions before you leave the endoscopy center. Be sure to ask the nurse for specific instructions if you take blood thinners such as aspirin, Coumadin or Plavix.     Any tissue samples removed during the exam will be sent to a lab for evaluation. It may take 5-7 working days for you to be notified of the results.      Are there possible complications from an upper endoscopy?   Although serious complications are rare, any medical procedure has the potential for risks. A nurse will review all potential warning signs with you before you leave the endoscopy center. Risks include:    Perforation, or a tear, of the lining of the stomach or esophagus     Bleeding from the biopsy site if tissue was removed     Reaction to medications used during the procedure         DIRECTIONS  From the north ([place], [place], [place])  Take [address] south, exit to [place] [address]. Get into the left hand marcos, turn left (east), go one-half mile to [address]. Turn left (north) on [address]. Go north to first stoplight, take a right on the  newly constructed road, [address] and follow it to the Emergency entrance.    From the south ([facility])  Take 35 north to the east split, [address], and exit to [address] . Turn left (west) on [address] [address] to [address]. Turn right (north) on [address]. Go north to first stoplight, take a right on the newly constructed road, [address] and follow it to the Emergency entrance.    From the east via 35[address] ([facility])  Take [address] south to [place] [address] exit. Turn right on [address] . Go west to [address]. Turn right (north) on [address], go to the first stoplight, take a right and follow the newly constructed road, [address], to the Emergency entrance.    From the east via [address] ([facility])  Take [address] west to [address]. Turn left (south) on [address] to [address]. Turn left (east) on [address] and follow the newly constructed road to the Emergency entrance.    From the west via [address] 13 ([facility]  Take [address] east to [address]. Turn right (south) on [address] to [address].  Turn left (east) on [address] and follow the newly constructed road to the Emergency entrance.

## 2017-03-09 NOTE — IP AVS SNAPSHOT
MRN:[id number]                      After Visit Summary   3/9/2017    Leyla Hines    MRN: [id number]           Thank you!     Thank you for choosing [facility] for your care. Our goal is always to provide you with excellent care. Hearing back from our patients is one way we can continue to improve our services. Please take a few minutes to complete the written survey that you may receive in the mail after you visit. If you would like to speak to someone directly about your visit please contact Patient Relations at [phone]. Thank you!          Patient Information     Date Of Birth          1975        About your hospital stay     You were admitted on:  March 9, 2017 You last received care in the:  [facility]    You were discharged on:  March 9, 2017       Who to Call     For medical emergencies, please call 911.  For non-urgent questions about your medical care, please call your primary care provider or clinic, [phone]  For questions related to your surgery, please call your surgery clinic        Attending Provider     Provider Specialty    Lester Looney MD Gastroenterology       Primary Care Provider Office Phone # Fax #    REESE [facility] [phone]       [facility] [place] [address] [place] [address]        Further instructions from your care team         Understanding H. pylori and Ulcers  Traditionally, ulcers, or sores in the lining of your digestive tract, were thought to be caused by too much spicy food, stress, or an anxious personality. We now know that most ulcers are probably due to infection with bacteria known as Helicobacter pylori (H. pylori).     H. pylori invade and disturb the lining of the digestive tract. Acid may weaken the area, causing an ulcer.      Common Ulcer Symptoms  Burning, cramping, or hunger-like pain in the stomach area, often one to three hours after a meal or in  the middle of the night  Pain that gets better or worse with eating  Nausea or vomiting  Black, tarry, or bloody stools (which means the ulcer is bleeding)  Or you may have no symptoms.     An ulcer can form in two areas of the digestive tract; the stomach and the duodenum (where the stomach meets the small intestine).      Your Evaluation  An evaluation by your doctor can show if you have an ulcer and determine whether it was caused by H. pylori. Your doctor may ask you questions, examine you, and possibly do some tests. These may include:  A special X-ray called a barium upper gastrointestinal series, to help locate an ulcer. During the test, you drink a chalky liquid. This liquid helps the ulcer show up on the X-ray.  An endoscopic exam, done with a long tube passed through your mouth into your stomach, to give the doctor a closer look at your ulcer. You will be lightly sedated for this procedure. Your doctor can also take a tissue sample to test for H. pylori.  Blood, stool, and breath tests are also available to show whether you have H. pylori in your digestive tract.  Your Treatment  To kill H. pylori so your ulcer can heal, your doctor will probably prescribe antibiotics. Other ulcer medications that help reduce stomach acid may also be prescribed as well. Testing after treatment is recommended to be sure the H. pylori infection is gone. Usually, killing H. pylori helps keep the ulcer from returning.    7396-1203 [place] [address]. All rights reserved. This information is not intended as a substitute for professional medical care. Always follow your healthcare professional's instructions.    Pending Results     No orders found from 3/7/2017 to 3/10/2017.            Admission Information     Date & Time Provider Department Dept. Phone    3/9/2017 Lester Looney MD [facility] Endoscopy [phone]      Your Vitals Were     Blood Pressure Respirations Pulse  "Oximetry             109/71 16 98%         [employer] Information     [employer] lets you send messages to your doctor, view your test results, renew your prescriptions, schedule appointments and more. To sign up, go to [email or web address] . Click on \"Log in\" on the left side of the screen, which will take you to the Welcome page. Then click on \"Sign up Now\" on the right side of the page.     You will be asked to enter the access code listed below, as well as some personal information. Please follow the directions to create your username and password.     Your access code is: [email or web address]  Expires: 2017  5:23 PM     Your access code will  in 90 days. If you need help or a new code, please call your [place] clinic or [phone].        Care EveryWhere ID     This is your Care EveryWhere ID. This could be used by other organizations to access your [place] medical records  [id number]           Review of your medicines      CONTINUE these medicines which have NOT CHANGED        Dose / Directions    HYDROcodone-acetaminophen 5-325 MG per tablet   Commonly known as:  NORCO        Dose:  1-2 tablet   Take 1-2 tablets by mouth   Refills:  0       multivitamin, therapeutic with minerals Tabs tablet        Dose:  1 tablet   Take 1 tablet by mouth daily.   Refills:  0       PRILOSEC OTC PO        Dose:  40 mg   Take 40 mg by mouth   Refills:  0       sucralfate 1 GM tablet   Commonly known as:  CARAFATE   Used for:  Abdominal pain, generalized        Dose:  1 g   Take 1 tablet (1 g) by mouth 4 times daily   Quantity:  30 tablet   Refills:  0                Protect others around you: Learn how to safely use, store and throw away your medicines at www.disposemymeds.org.             Medication List: This is a list of all your medications and when to take them. Check marks below indicate your daily home schedule. Keep this list as a reference.      Medications           Morning Afternoon Evening Bedtime As Needed    " HYDROcodone-acetaminophen 5-325 MG per tablet   Commonly known as:  NORCO   Take 1-2 tablets by mouth                                multivitamin, therapeutic with minerals Tabs tablet   Take 1 tablet by mouth daily.                                PRILOSEC OTC PO   Take 40 mg by mouth                                sucralfate 1 GM tablet   Commonly known as:  CARAFATE   Take 1 tablet (1 g) by mouth 4 times daily

## 2017-03-09 NOTE — H&P
Pre-Endoscopy History and Physical     Leyla Hines MRN# [id number]   YOB: 1975 Age: 41 year old     Date of Procedure: 3/9/2017  Primary care provider: Radha Kelly  Type of Endoscopy: Gastroscopy with possible biopsy, possible dilation  Reason for Procedure: pain  Type of Anesthesia Anticipated: Conscious Sedation    HPI:    Leyla is a 41 year old female who will be undergoing the above procedure.      A history and physical has been performed. The patient's medications and allergies have been reviewed. The risks and benefits of the procedure and the sedation options and risks were discussed with the patient.  All questions were answered and informed consent was obtained.      She denies a personal or family history of anesthesia complications or bleeding disorders.     Patient Active Problem List   Diagnosis     PE (pulmonary embolism)        Past Medical History   Diagnosis Date     Pulmonary emboli (H)         Past Surgical History   Procedure Laterality Date      section  02/10/07     high BP      section  09     repeat      section  2011     Procedure: SECTION; Surgeon:ANDREI RUSSELL; Location:[facility] L+D     Colonoscopy N/A 2016     Procedure: COLONOSCOPY;  Surgeon: Lester Looney MD;  Location:  GI       Social History   Substance Use Topics     Smoking status: Never Smoker     Smokeless tobacco: Never Used     Alcohol use No       Family History   Problem Relation Age of Onset     Hyperlipidemia Mother      Other Cancer Mother      lung cancer cause of death at 63     Thyroid Disease Mother      hyperthyroidism     OSTEOPOROSIS Mother      Coronary Artery Disease Father      Myocardial Infarction Father      Colon Cancer Father      about age 54     Other Cancer Father      esophageal cause of death at 67     Hypertension Father      Hyperlipidemia Father      Hypertension Brother      Asthma Brother      Colon Cancer Brother       "2015     Emphysema Maternal Grandmother      Other Cancer Maternal Grandfather      lymphoma cause of death at age 54     Breast Cancer Paternal Grandmother      in 80s     Alzheimer Disease Paternal Grandmother      DIABETES Paternal Grandfather      Colon Cancer Paternal Grandfather      dx in 80s     Colon Cancer Paternal Uncle        Prior to Admission medications    Medication Sig Start Date End Date Taking? Authorizing Provider   Omeprazole Magnesium (PRILOSEC OTC PO) Take 40 mg by mouth   Yes Reported, Patient   HYDROcodone-acetaminophen (NORCO) 5-325 MG per tablet Take 1-2 tablets by mouth 2/27/17  Yes Reported, Patient   sucralfate (CARAFATE) 1 GM tablet Take 1 tablet (1 g) by mouth 4 times daily 3/2/17  Yes Corbin Gaspar MD   multivitamin, therapeutic with minerals (THERA-VIT-M) TABS Take 1 tablet by mouth daily.    Unknown, Entered By History       Allergies   Allergen Reactions     Betadine [Povidone Iodine]         REVIEW OF SYSTEMS:   5 point ROS negative except as noted above in HPI, including Gen., Resp., CV, GI &  system review.    PHYSICAL EXAM:   /73  Resp 17  SpO2 100% Estimated body mass index is 30.23 kg/(m^2) as calculated from the following:    Height as of 3/3/17: 1.708 m (5' 7.25\").    Weight as of 3/3/17: 88.2 kg (194 lb 7 oz).   GENERAL APPEARANCE: alert, and oriented  MENTAL STATUS: alert  AIRWAY EXAM: Mallampatti Class I (visualization of the soft palate, fauces, uvula, anterior and posterior pillars)  RESP: lungs clear to auscultation - no rales, rhonchi or wheezes  CV: regular rates and rhythm  DIAGNOSTICS:    Not indicated    IMPRESSION   ASA Class 2 - Mild systemic disease    PLAN:   Plan for Gastroscopy with possible biopsy, possible dilation. We discussed the risks, benefits and alternatives and the patient wished to proceed.    The above has been forwarded to the consulting provider.      Signed Electronically by: Lester Looney  March 9, 2017          "

## 2017-03-10 LAB — COPATH REPORT: NORMAL

## 2017-04-23 ENCOUNTER — TRANSFERRED RECORDS (OUTPATIENT)
Dept: HEALTH INFORMATION MANAGEMENT | Facility: CLINIC | Age: 42
End: 2017-04-23

## 2017-04-23 ENCOUNTER — HOSPITAL ENCOUNTER (INPATIENT)
Facility: CLINIC | Age: 42
LOS: 2 days | Discharge: HOME | DRG: 175 | End: 2017-04-25
Attending: INTERNAL MEDICINE | Admitting: INTERNAL MEDICINE
Payer: COMMERCIAL

## 2017-04-23 DIAGNOSIS — I26.99 BILATERAL PULMONARY EMBOLISM (H): Primary | ICD-10-CM

## 2017-04-23 DIAGNOSIS — I26.09 ACUTE COR PULMONALE (H): ICD-10-CM

## 2017-04-23 LAB
BASOPHILS # BLD AUTO: 0 10E9/L (ref 0–0.2)
BASOPHILS NFR BLD AUTO: 0.3 %
CREAT SERPL-MCNC: 0.6 MG/DL (ref 0.52–1.04)
DIFFERENTIAL METHOD BLD: ABNORMAL
EOSINOPHIL # BLD AUTO: 0 10E9/L (ref 0–0.7)
EOSINOPHIL NFR BLD AUTO: 0.1 %
ERYTHROCYTE [DISTWIDTH] IN BLOOD BY AUTOMATED COUNT: 13.8 % (ref 10–15)
GFR SERPL CREATININE-BSD FRML MDRD: NORMAL ML/MIN/1.7M2
HCT VFR BLD AUTO: 40.9 % (ref 35–47)
HGB BLD-MCNC: 13.9 G/DL (ref 11.7–15.7)
IMM GRANULOCYTES # BLD: 0 10E9/L (ref 0–0.4)
IMM GRANULOCYTES NFR BLD: 0.3 %
INR PPP: 1.17 (ref 0.86–1.14)
LYMPHOCYTES # BLD AUTO: 1.3 10E9/L (ref 0.8–5.3)
LYMPHOCYTES NFR BLD AUTO: 10.6 %
MCH RBC QN AUTO: 28.9 PG (ref 26.5–33)
MCHC RBC AUTO-ENTMCNC: 34 G/DL (ref 31.5–36.5)
MCV RBC AUTO: 85 FL (ref 78–100)
MONOCYTES # BLD AUTO: 0.7 10E9/L (ref 0–1.3)
MONOCYTES NFR BLD AUTO: 5.5 %
NEUTROPHILS # BLD AUTO: 9.9 10E9/L (ref 1.6–8.3)
NEUTROPHILS NFR BLD AUTO: 83.2 %
NRBC # BLD AUTO: 0 10*3/UL
NRBC BLD AUTO-RTO: 0 /100
PLATELET # BLD AUTO: 196 10E9/L (ref 150–450)
RBC # BLD AUTO: 4.81 10E12/L (ref 3.8–5.2)
TROPONIN I SERPL-MCNC: 1.01 UG/L (ref 0–0.04)
WBC # BLD AUTO: 11.9 10E9/L (ref 4–11)

## 2017-04-23 PROCEDURE — 40000275 ZZH STATISTIC RCP TIME EA 10 MIN

## 2017-04-23 PROCEDURE — 25000132 ZZH RX MED GY IP 250 OP 250 PS 637

## 2017-04-23 PROCEDURE — 82565 ASSAY OF CREATININE: CPT | Performed by: INTERNAL MEDICINE

## 2017-04-23 PROCEDURE — 85610 PROTHROMBIN TIME: CPT | Performed by: PHYSICIAN ASSISTANT

## 2017-04-23 PROCEDURE — 85025 COMPLETE CBC W/AUTO DIFF WBC: CPT | Performed by: INTERNAL MEDICINE

## 2017-04-23 PROCEDURE — 93010 ELECTROCARDIOGRAM REPORT: CPT | Performed by: INTERNAL MEDICINE

## 2017-04-23 PROCEDURE — 84484 ASSAY OF TROPONIN QUANT: CPT | Performed by: PHYSICIAN ASSISTANT

## 2017-04-23 PROCEDURE — 12000000 ZZH R&B MED SURG/OB

## 2017-04-23 PROCEDURE — 93005 ELECTROCARDIOGRAM TRACING: CPT

## 2017-04-23 PROCEDURE — 36415 COLL VENOUS BLD VENIPUNCTURE: CPT | Performed by: INTERNAL MEDICINE

## 2017-04-23 PROCEDURE — 25000128 H RX IP 250 OP 636: Performed by: PHYSICIAN ASSISTANT

## 2017-04-23 PROCEDURE — 84484 ASSAY OF TROPONIN QUANT: CPT

## 2017-04-23 PROCEDURE — 36415 COLL VENOUS BLD VENIPUNCTURE: CPT

## 2017-04-23 PROCEDURE — 84484 ASSAY OF TROPONIN QUANT: CPT | Performed by: INTERNAL MEDICINE

## 2017-04-23 PROCEDURE — 25000132 ZZH RX MED GY IP 250 OP 250 PS 637: Performed by: PHYSICIAN ASSISTANT

## 2017-04-23 PROCEDURE — 99222 1ST HOSP IP/OBS MODERATE 55: CPT | Mod: AI | Performed by: PHYSICIAN ASSISTANT

## 2017-04-23 RX ORDER — HYDROMORPHONE HYDROCHLORIDE 1 MG/ML
.3-.5 INJECTION, SOLUTION INTRAMUSCULAR; INTRAVENOUS; SUBCUTANEOUS
Status: DISCONTINUED | OUTPATIENT
Start: 2017-04-23 | End: 2017-04-25 | Stop reason: HOSPADM

## 2017-04-23 RX ORDER — NALOXONE HYDROCHLORIDE 0.4 MG/ML
.1-.4 INJECTION, SOLUTION INTRAMUSCULAR; INTRAVENOUS; SUBCUTANEOUS
Status: DISCONTINUED | OUTPATIENT
Start: 2017-04-23 | End: 2017-04-25 | Stop reason: HOSPADM

## 2017-04-23 RX ORDER — ONDANSETRON 2 MG/ML
4 INJECTION INTRAMUSCULAR; INTRAVENOUS EVERY 6 HOURS PRN
Status: DISCONTINUED | OUTPATIENT
Start: 2017-04-23 | End: 2017-04-25 | Stop reason: HOSPADM

## 2017-04-23 RX ORDER — LIDOCAINE 40 MG/G
CREAM TOPICAL
Status: DISCONTINUED | OUTPATIENT
Start: 2017-04-23 | End: 2017-04-25 | Stop reason: HOSPADM

## 2017-04-23 RX ORDER — NITROGLYCERIN 0.4 MG/1
TABLET SUBLINGUAL
Status: DISPENSED
Start: 2017-04-23 | End: 2017-04-24

## 2017-04-23 RX ORDER — ONDANSETRON 4 MG/1
4 TABLET, ORALLY DISINTEGRATING ORAL EVERY 6 HOURS PRN
Status: DISCONTINUED | OUTPATIENT
Start: 2017-04-23 | End: 2017-04-25 | Stop reason: HOSPADM

## 2017-04-23 RX ORDER — ACETAMINOPHEN 325 MG/1
650 TABLET ORAL EVERY 4 HOURS PRN
Status: DISCONTINUED | OUTPATIENT
Start: 2017-04-23 | End: 2017-04-25 | Stop reason: HOSPADM

## 2017-04-23 RX ORDER — SODIUM CHLORIDE 9 MG/ML
INJECTION, SOLUTION INTRAVENOUS CONTINUOUS
Status: DISCONTINUED | OUTPATIENT
Start: 2017-04-23 | End: 2017-04-24

## 2017-04-23 RX ORDER — OXYCODONE AND ACETAMINOPHEN 5; 325 MG/1; MG/1
1-2 TABLET ORAL EVERY 4 HOURS PRN
Status: DISCONTINUED | OUTPATIENT
Start: 2017-04-23 | End: 2017-04-25 | Stop reason: HOSPADM

## 2017-04-23 RX ORDER — NITROGLYCERIN 0.4 MG/1
0.4 TABLET SUBLINGUAL EVERY 5 MIN PRN
Status: DISCONTINUED | OUTPATIENT
Start: 2017-04-23 | End: 2017-04-25 | Stop reason: HOSPADM

## 2017-04-23 RX ADMIN — NITROGLYCERIN 0.4 MG: 0.4 TABLET SUBLINGUAL at 22:39

## 2017-04-23 RX ADMIN — Medication 6000 UNITS: at 19:48

## 2017-04-23 RX ADMIN — SODIUM CHLORIDE 75 ML/HR: 9 INJECTION, SOLUTION INTRAVENOUS at 18:32

## 2017-04-23 RX ADMIN — OXYCODONE HYDROCHLORIDE AND ACETAMINOPHEN 1 TABLET: 5; 325 TABLET ORAL at 18:32

## 2017-04-23 RX ADMIN — OXYCODONE HYDROCHLORIDE AND ACETAMINOPHEN 1 TABLET: 5; 325 TABLET ORAL at 19:52

## 2017-04-23 NOTE — PHARMACY-ADMISSION MEDICATION HISTORY
Admission medication history interview status for the 4/23/2017  admission is complete. See EPIC admission navigator for prior to admission medications     Medication history source reliability:Good    Actions taken by pharmacist (provider contacted, etc):None     Additional medication history information not noted on PTA med list :None    Medication reconciliation/reorder completed by provider prior to medication history? No    Time spent in this activity: 5 minutes    Prior to Admission medications    Not on File     Marilia MontoyaD.

## 2017-04-23 NOTE — IP AVS SNAPSHOT
[place] [facility] [address] [facility]    [address] [place] [address]    Phone:  [phone]                                       After Visit Summary   4/23/2017    Leyla Hines    MRN: [id number]           After Visit Summary Signature Page     I have received my discharge instructions, and my questions have been answered. I have discussed any challenges I see with this plan with the nurse or doctor.    ..........................................................................................................................................  Patient/Patient Representative Signature      ..........................................................................................................................................  Patient Representative Print Name and Relationship to Patient    ..................................................               ................................................  Date                                            Time    ..........................................................................................................................................  Reviewed by Signature/Title    ...................................................              ..............................................  Date                                                            Time

## 2017-04-23 NOTE — IP AVS SNAPSHOT
MRN:[id number]                      After Visit Summary   4/23/2017    Leyla Hines    MRN: [id number]           Thank you!     Thank you for choosing [facility] for your care. Our goal is always to provide you with excellent care. Hearing back from our patients is one way we can continue to improve our services. Please take a few minutes to complete the written survey that you may receive in the mail after you visit with us. Thank you!        Patient Information     Date Of Birth          1975        Designated Caregiver       Most Recent Value    Caregiver    Will someone help with your care after discharge? no      About your hospital stay     You were admitted on:  April 23, 2017 You last received care in the:  [place] [address] [facility]    You were discharged on:  April 25, 2017        Reason for your hospital stay       Further evaluation and management of blood clots in lungs.                  Who to Call     For medical emergencies, please call [phone].  For non-urgent questions about your medical care, please call your primary care provider or clinic, [phone]          Attending Provider     Provider Specialty    Lv Espinal MD Internal Medicine       Primary Care Provider Office Phone # Fax #    REESE [facility] [phone]       [facility] [place] [address] [place] [address]        After Care Instructions     Activity       Your activity upon discharge: activity as tolerated            Diet       Follow this diet upon discharge:    Regular Diet Adult                  Follow-up Appointments     Follow-up and recommended labs and tests        Follow up with primary care provider, Radha Kelly, within 7 days for hospital follow- up.  The following labs/tests are recommended: CBC as started on Xarelto.  Repeat echo in 6-8 weeks.    Follow up with Dr. Eng in 2 weeks. Call [phone] to schedule.                   Your next 10 appointments already scheduled     May 09, 2017 10:00 AM CDT   Return Visit with Jonelle Eng MD   [facility] ([facility])    [facility] [place]  [address]  [place] [id number] [phone]              Future tests that were ordered for you     Echocardiogram Complete           **CBC with platelets FUTURE 14d       Last Lab Result: Hemoglobin (g/dL)       Date                     Value                 04/24/2017               12.5             ----------                  Further instructions from your care team       Start Xarelto 1 tablet twice daily x 3 weeks. After that will take 1 tablet daily (this will need to be filled by Dr. Eng or primary provider).    Percocet 1-2 tablets every 6 hours as needed for pain. This can be constipating. Remain active and increase water intake. Start over the counter stool softener if no bowel movement after 2-3 days.    Follow up with primary care provider, Radha Kelly, within 7 days for hospital follow- up.  Lab draw beforehand. Repeat echo in 6-8 weeks.    Follow up with Dr. Eng in 2 weeks. Call [phone] to schedule.    Present to ED if trauma to head or persistent bleeding while on blood thinner.    Pending Results     Date and Time Order Name Status Description    4/25/2017 1057 Factor 2 and 5 mutation analysis In process     4/24/2017 1325 Lupus panel In process     4/24/2017 1325 Factor 5 leiden mutation analysis In process     4/24/2017 1325 F2 prothrombin 77930P Mut Anal In process     4/23/2017 1948 EKG 12-lead, tracing only Preliminary             Statement of Approval     Ordered          04/25/17 1155  I have reviewed and agree with all the recommendations and orders detailed in this document.  EFFECTIVE NOW     Approved and electronically signed by:  Barthell, Joanna Kersten Ulmen, PA-C             Admission Information     Date & Time Provider Department Dept. Phone     "2017 Lv Espinal MD [place] [address] [facility] [phone]      Your Vitals Were     Blood Pressure Pulse Temperature Respirations Height Weight    113/79 (BP Location: Left arm) 93 97.9  F (36.6  C) (Oral) 16 1.727 m (5' 8\") 88.9 kg (196 lb)    Pulse Oximetry BMI (Body Mass Index)                98% 29.8 kg/m2          [employer] Information     [employer] lets you send messages to your doctor, view your test results, renew your prescriptions, schedule appointments and more. To sign up, go to [email or web address] . Click on \"Log in\" on the left side of the screen, which will take you to the Welcome page. Then click on \"Sign up Now\" on the right side of the page.     You will be asked to enter the access code listed below, as well as some personal information. Please follow the directions to create your username and password.     Your access code is: [email or web address]  Expires: 2017  6:23 PM     Your access code will  in 90 days. If you need help or a new code, please call your [place] clinic or [phone].        Care EveryWhere ID     This is your Care EveryWhere ID. This could be used by other organizations to access your [place] medical records  [id number]           Review of your medicines      START taking        Dose / Directions    oxyCODONE-acetaminophen 5-325 MG per tablet   Commonly known as:  PERCOCET        Dose:  1-2 tablet   Take 1-2 tablets by mouth every 6 hours as needed for moderate to severe pain   Quantity:  12 tablet   Refills:  0       rivaroxaban ANTICOAGULANT 15 MG Tabs tablet   Commonly known as:  XARELTO   Indication:  Blood Clot in a Blood Vessel of the Lung        Dose:  15 mg   Take 1 tablet (15 mg) by mouth 2 times daily (with meals) for 21 days   Quantity:  42 tablet   Refills:  0            Where to get your medicines      These medications were sent to [facility] - [address]  [address] [place] [address] " [address]     Phone:  [phone]     rivaroxaban ANTICOAGULANT 15 MG Tabs tablet         Some of these will need a paper prescription and others can be bought over the counter. Ask your nurse if you have questions.     Bring a paper prescription for each of these medications     oxyCODONE-acetaminophen 5-325 MG per tablet                Protect others around you: Learn how to safely use, store and throw away your medicines at www.disposemymeds.org.             Medication List: This is a list of all your medications and when to take them. Check marks below indicate your daily home schedule. Keep this list as a reference.      Medications           Morning Afternoon Evening Bedtime As Needed    oxyCODONE-acetaminophen 5-325 MG per tablet   Commonly known as:  PERCOCET   Take 1-2 tablets by mouth every 6 hours as needed for moderate to severe pain   Last time this was given:  1 tablet on 4/25/2017  7:57 AM                                   rivaroxaban ANTICOAGULANT 15 MG Tabs tablet   Commonly known as:  XARELTO   Take 1 tablet (15 mg) by mouth 2 times daily (with meals) for 21 days   Last time this was given:  15 mg on 4/25/2017  7:57 AM

## 2017-04-24 ENCOUNTER — APPOINTMENT (OUTPATIENT)
Dept: CARDIOLOGY | Facility: CLINIC | Age: 42
DRG: 175 | End: 2017-04-24
Attending: PHYSICIAN ASSISTANT
Payer: COMMERCIAL

## 2017-04-24 ENCOUNTER — APPOINTMENT (OUTPATIENT)
Dept: ULTRASOUND IMAGING | Facility: CLINIC | Age: 42
DRG: 175 | End: 2017-04-24
Attending: PHYSICIAN ASSISTANT
Payer: COMMERCIAL

## 2017-04-24 LAB
ALBUMIN SERPL-MCNC: 3.1 G/DL (ref 3.4–5)
ALP SERPL-CCNC: 65 U/L (ref 40–150)
ALT SERPL W P-5'-P-CCNC: 49 U/L (ref 0–50)
ANION GAP SERPL CALCULATED.3IONS-SCNC: 9 MMOL/L (ref 3–14)
AST SERPL W P-5'-P-CCNC: 38 U/L (ref 0–45)
BASOPHILS # BLD AUTO: 0 10E9/L (ref 0–0.2)
BASOPHILS NFR BLD AUTO: 0.2 %
BILIRUB SERPL-MCNC: 0.6 MG/DL (ref 0.2–1.3)
BUN SERPL-MCNC: 11 MG/DL (ref 7–30)
CALCIUM SERPL-MCNC: 7.8 MG/DL (ref 8.5–10.1)
CHLORIDE SERPL-SCNC: 111 MMOL/L (ref 94–109)
CO2 SERPL-SCNC: 21 MMOL/L (ref 20–32)
CREAT SERPL-MCNC: 0.53 MG/DL (ref 0.52–1.04)
DIFFERENTIAL METHOD BLD: NORMAL
EOSINOPHIL # BLD AUTO: 0 10E9/L (ref 0–0.7)
EOSINOPHIL NFR BLD AUTO: 0.4 %
ERYTHROCYTE [DISTWIDTH] IN BLOOD BY AUTOMATED COUNT: 14.1 % (ref 10–15)
GFR SERPL CREATININE-BSD FRML MDRD: ABNORMAL ML/MIN/1.7M2
GLUCOSE SERPL-MCNC: 93 MG/DL (ref 70–99)
HCT VFR BLD AUTO: 37.3 % (ref 35–47)
HGB BLD-MCNC: 12.5 G/DL (ref 11.7–15.7)
IMM GRANULOCYTES # BLD: 0 10E9/L (ref 0–0.4)
IMM GRANULOCYTES NFR BLD: 0.2 %
LMWH PPP CHRO-ACNC: 0.26 IU/ML
LMWH PPP CHRO-ACNC: 0.55 IU/ML
LMWH PPP CHRO-ACNC: 1.57 IU/ML
LYMPHOCYTES # BLD AUTO: 1.5 10E9/L (ref 0.8–5.3)
LYMPHOCYTES NFR BLD AUTO: 15.9 %
MCH RBC QN AUTO: 28.8 PG (ref 26.5–33)
MCHC RBC AUTO-ENTMCNC: 33.5 G/DL (ref 31.5–36.5)
MCV RBC AUTO: 86 FL (ref 78–100)
MONOCYTES # BLD AUTO: 1 10E9/L (ref 0–1.3)
MONOCYTES NFR BLD AUTO: 10.2 %
NEUTROPHILS # BLD AUTO: 6.9 10E9/L (ref 1.6–8.3)
NEUTROPHILS NFR BLD AUTO: 73.1 %
NRBC # BLD AUTO: 0 10*3/UL
NRBC BLD AUTO-RTO: 0 /100
PLATELET # BLD AUTO: 165 10E9/L (ref 150–450)
POTASSIUM SERPL-SCNC: 3.6 MMOL/L (ref 3.4–5.3)
PROT SERPL-MCNC: 6.3 G/DL (ref 6.8–8.8)
RBC # BLD AUTO: 4.34 10E12/L (ref 3.8–5.2)
SODIUM SERPL-SCNC: 141 MMOL/L (ref 133–144)
TROPONIN I SERPL-MCNC: 0.39 UG/L (ref 0–0.04)
TROPONIN I SERPL-MCNC: 0.49 UG/L (ref 0–0.04)
TROPONIN I SERPL-MCNC: 0.63 UG/L (ref 0–0.04)
TROPONIN I SERPL-MCNC: 0.87 UG/L (ref 0–0.04)
WBC # BLD AUTO: 9.4 10E9/L (ref 4–11)

## 2017-04-24 PROCEDURE — 93306 TTE W/DOPPLER COMPLETE: CPT | Mod: 26 | Performed by: INTERNAL MEDICINE

## 2017-04-24 PROCEDURE — 99233 SBSQ HOSP IP/OBS HIGH 50: CPT | Performed by: INTERNAL MEDICINE

## 2017-04-24 PROCEDURE — 25500064 ZZH RX 255 OP 636: Performed by: INTERNAL MEDICINE

## 2017-04-24 PROCEDURE — 85613 RUSSELL VIPER VENOM DILUTED: CPT | Performed by: INTERNAL MEDICINE

## 2017-04-24 PROCEDURE — 00000167 ZZHCL STATISTIC INR NC: Performed by: INTERNAL MEDICINE

## 2017-04-24 PROCEDURE — 81240 F2 GENE: CPT | Performed by: INTERNAL MEDICINE

## 2017-04-24 PROCEDURE — 36415 COLL VENOUS BLD VENIPUNCTURE: CPT | Performed by: INTERNAL MEDICINE

## 2017-04-24 PROCEDURE — 40000264 ECHO COMPLETE WITH OPTISON

## 2017-04-24 PROCEDURE — 81241 F5 GENE: CPT | Performed by: INTERNAL MEDICINE

## 2017-04-24 PROCEDURE — 36415 COLL VENOUS BLD VENIPUNCTURE: CPT | Performed by: PHYSICIAN ASSISTANT

## 2017-04-24 PROCEDURE — 85306 CLOT INHIBIT PROT S FREE: CPT | Performed by: INTERNAL MEDICINE

## 2017-04-24 PROCEDURE — 85520 HEPARIN ASSAY: CPT | Performed by: INTERNAL MEDICINE

## 2017-04-24 PROCEDURE — 99222 1ST HOSP IP/OBS MODERATE 55: CPT | Performed by: INTERNAL MEDICINE

## 2017-04-24 PROCEDURE — 25000128 H RX IP 250 OP 636: Performed by: INTERNAL MEDICINE

## 2017-04-24 PROCEDURE — 80053 COMPREHEN METABOLIC PANEL: CPT | Performed by: PHYSICIAN ASSISTANT

## 2017-04-24 PROCEDURE — 85525 HEPARIN NEUTRALIZATION: CPT | Performed by: INTERNAL MEDICINE

## 2017-04-24 PROCEDURE — 85730 THROMBOPLASTIN TIME PARTIAL: CPT | Performed by: INTERNAL MEDICINE

## 2017-04-24 PROCEDURE — 85303 CLOT INHIBIT PROT C ACTIVITY: CPT | Performed by: INTERNAL MEDICINE

## 2017-04-24 PROCEDURE — 25000132 ZZH RX MED GY IP 250 OP 250 PS 637: Performed by: PHYSICIAN ASSISTANT

## 2017-04-24 PROCEDURE — 84484 ASSAY OF TROPONIN QUANT: CPT | Performed by: PHYSICIAN ASSISTANT

## 2017-04-24 PROCEDURE — 93970 EXTREMITY STUDY: CPT

## 2017-04-24 PROCEDURE — 00000401 ZZHCL STATISTIC THROMBIN TIME NC: Performed by: INTERNAL MEDICINE

## 2017-04-24 PROCEDURE — 12000000 ZZH R&B MED SURG/OB

## 2017-04-24 PROCEDURE — 99232 SBSQ HOSP IP/OBS MODERATE 35: CPT | Performed by: PHYSICIAN ASSISTANT

## 2017-04-24 PROCEDURE — 85025 COMPLETE CBC W/AUTO DIFF WBC: CPT | Performed by: PHYSICIAN ASSISTANT

## 2017-04-24 RX ADMIN — OXYCODONE HYDROCHLORIDE AND ACETAMINOPHEN 2 TABLET: 5; 325 TABLET ORAL at 00:10

## 2017-04-24 RX ADMIN — RIVAROXABAN 15 MG: 15 TABLET, FILM COATED ORAL at 20:13

## 2017-04-24 RX ADMIN — HEPARIN SODIUM 1150 UNITS/HR: 10000 INJECTION, SOLUTION INTRAVENOUS at 04:51

## 2017-04-24 RX ADMIN — OXYCODONE HYDROCHLORIDE AND ACETAMINOPHEN 2 TABLET: 5; 325 TABLET ORAL at 12:58

## 2017-04-24 RX ADMIN — OXYCODONE HYDROCHLORIDE AND ACETAMINOPHEN 2 TABLET: 5; 325 TABLET ORAL at 07:33

## 2017-04-24 RX ADMIN — HUMAN ALBUMIN MICROSPHERES AND PERFLUTREN 2 ML: 10; .22 INJECTION, SOLUTION INTRAVENOUS at 11:00

## 2017-04-24 RX ADMIN — OXYCODONE HYDROCHLORIDE AND ACETAMINOPHEN 2 TABLET: 5; 325 TABLET ORAL at 17:58

## 2017-04-24 NOTE — PLAN OF CARE
Problem: Goal Outcome Summary  Goal: Goal Outcome Summary  Outcome: Improving  Pt. A/Ox4. VSS on RA. Tachy at times. Independent. Pt. Reported pain in left chest,  this morning of 7/10 reduced to 2/10 with percocet given. Echo performed today. IVF 75 ml/hr Heparin running 11.5 ml/hr. Hep10a .55. Pt. Ambulated in golden and rested today. Tele NSR. Possible discharge tomorrow.

## 2017-04-24 NOTE — CONSULTS
[facility]    Cardiology Consultation     Date of Admission:  4/23/2017  Date of Consult (When I saw the patient): 04/24/17    Assessment & Plan   Leyla Hines is a 41 year old female who was admitted on 4/23/2017 with bilateral PEs. I was asked to see the patient for chest pain, trop elevation and EKG changes.    1. Trop elevation. Most likely due to R heart strain in setting of PE. EKG and CT consistent with R heart strain, no ANGELIQUE changes noted. Trop peaked at 1.1 and is now down trending. Low risk of CAD, although father with MI at a young age.  --She is currently being appropriately anticoagulated.  --Echo pending.    2. Bilateral PEs.  Appears unprovoked, does have prior PE hx which was thought to be due to OCPs/injury. Agree with hematology consult.     Further recs pending echo results.    Echo showed RV moderately dilated and RV systolic funtion mild to mod reduced. Consistent with B/L Pulmonary embolism with acute cor pulmonale. See Dr Jay separate note for additional details/recs.    Zoraida Gonzalez PA-C       Code Status    Full Code    Reason for Consult   Reason for consult: I was asked by Nevin Barger PA-C to evaluate this patient for trop elevation and EKG changes.    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 She presented to the [facility] with sudden onset shortness of breath similar to at the time of her prior PE.  A CT PE protocol was obtained which showed acute bilateral pulmonary emboli with scattered segmental and subsegmental PEs in  "the pulmonary arteries of all lobes in both lungs. There was evidence of right heart strain on CT scan as well as EKG. The patient was started on heparin drip and transferred to [facility] for additional evaluation. Initial trop was elevated at 0.4, it peaked at 1.1.  She is not a smoker, is not on any hormone therapy.  She has had no recent travel, injury, or prolonged immobilization. No symptoms prior to yesterday, denies SONI or exertional CP prior to yesterday.  Last night had an RRT called for chest pain, EKG overall unchanged-although TWI inversion in the lateral leads slightly more prominent. Trop continues to decrease following the episode.    Father did have a \"small MI\" when he was 37, reportedly treated medically- did not ever have stents or CABG. Did have a CEA in his 60s and subsequently  of cancer also in his 60s. Mother did not have CAD or CVA. Her brother is 10 years older than she is and does not have a cardiac history.     Past Medical History   I have reviewed this patient's medical history and updated it with pertinent information if needed.   Past Medical History:   Diagnosis Date     Pulmonary emboli (H)        Past Surgical History   I have reviewed this patient's surgical history and updated it with pertinent information if needed.  Past Surgical History:   Procedure Laterality Date      SECTION  02/10/07    high BP      SECTION  09    repeat      SECTION  2011    Procedure: SECTION; Surgeon:ANDREI RUSSELL; Location:[facility] L+D     COLONOSCOPY N/A 2016    Procedure: COLONOSCOPY;  Surgeon: Lester Looney MD;  Location:  GI     ESOPHAGOSCOPY, GASTROSCOPY, DUODENOSCOPY (EGD), COMBINED N/A 3/9/2017    Procedure: COMBINED ESOPHAGOSCOPY, GASTROSCOPY, DUODENOSCOPY (EGD), BIOPSY SINGLE OR MULTIPLE;  Surgeon: Lester Looney MD;  Location:  GI       Prior to Admission Medications   None     Allergies   Allergies   Allergen Reactions "     Betadine [Povidone Iodine]        Social History   I have reviewed this patient's social history and updated it with pertinent information if needed. Leyla Hines  reports that she has never smoked. She has never used smokeless tobacco. She reports that she does not drink alcohol or use illicit drugs.    Family History   I have reviewed this patient's family history and updated it with pertinent information if needed.   Family History   Problem Relation Age of Onset     Hyperlipidemia Mother      Other Cancer Mother      lung cancer cause of death at 63     Thyroid Disease Mother      hyperthyroidism     OSTEOPOROSIS Mother      Coronary Artery Disease Father      Myocardial Infarction Father      Colon Cancer Father      about age 54     Other Cancer Father      esophageal cause of death at 67     Hypertension Father      Hyperlipidemia Father      Hypertension Brother      Asthma Brother      Colon Cancer Brother      2015     Emphysema Maternal Grandmother      Other Cancer Maternal Grandfather      lymphoma cause of death at age 54     Breast Cancer Paternal Grandmother      in 80s     Alzheimer Disease Paternal Grandmother      DIABETES Paternal Grandfather      Colon Cancer Paternal Grandfather      dx in 80s     Colon Cancer Paternal Uncle        Review of Systems   The 10 point Review of Systems is negative other than noted in the HPI or here.     Physical Exam   Temp: 97.7  F (36.5  C) Temp src: Oral BP: 118/75 Pulse: 102   Resp: 16 SpO2: 95 % O2 Device: None (Room air)    Vital Signs with Ranges  Temp:  [97.7  F (36.5  C)-98.1  F (36.7  C)] 97.7  F (36.5  C)  Pulse:  [] 102  Resp:  [16-18] 16  BP: (117-127)/(75-81) 118/75  SpO2:  [94 %-98 %] 95 %  196 lbs 0 oz    Constitutional     alert and oriented, in no acute distress.     Skin     warm and dry to touch    ENT     no pallor or cyanosis    Neck    Supple, JVP normal, no carotid bruit    Chest     no tenderness to palpation    Lungs  clear to  auscultation     Cardiac  regular rhythm-borderline tachy, S1 normal, S2 normal, No S3 or S4, no murmurs, no rubs    Abdomen     abdomen soft, bowel sounds normoactive,     Extremities and Back     no clubbing, cyanosis. No edema observed.        Neurological     no gross motor deficits noted, affect appropriate, oriented to time, person and place.    Data   I personally reviewed the EKG tracings, findings detailed above.    Echocardiology:   Pending     Outside CT report reviewed and detailed above. LE ultrasound reviewed, no DVTs noted.    Lab Results   Component Value Date    TROPI 0.493 () 04/24/2017    TROPI 0.627 () 04/24/2017    TROPI 0.870 () 04/23/2017    TROPI 1.011 () 04/23/2017    TROPI <0.012 06/27/2013

## 2017-04-24 NOTE — PROGRESS NOTES
Talked with Dr. Espinal, elevated troponin, will continue to monitor, redraw at 2300.  Patient on heparin gtt rate increased to 13.5 units per hour, 6000 unit bolus given.  Chest pain in left upper chest 5/10 given one percocet with little relief, MD said to give another percocet now to help decrease pain and ordered stat EKG.

## 2017-04-24 NOTE — PROGRESS NOTES
[facility]    Hospitalist Progress Note    Assessment & Plan   Leyla Hines is a 47-year-old female with history of anxiety and prior presumed provoked pulmonary embolism from OCPs who was directly admitted from [facility] after she was found to have acute bilateral pulmonary emboli with evidence of right heart strain.      Bilateral pulmonary emboli with evidence of right heart strain. Presented with left-sided pleuritic chest pain and SOB x 1 day. CT PE showed acute bilateral pulmonary emboli affecting all lobes of both lungs with an overall moderate volume. EKG and CT consistent with right heart strain. Hx PE thought provoked from OCPs (2013) and completed 6-months warfarin. No other known risk factors for VTE - non smoker, no exogenous estrogen use, no recent travel or injury,no known coagulopathies. Echo as below. Ultrasound bilateral LE negative.  -- Monitor patient on telemetry with continuous pulse oximetry.   -- Discontinue heparin. Start Xarelto and assess toleration.  -- Appreciate Hematology consult and their recommendations for hypercoagulability workup.  -- Repeat echo in 6-8 weeks to assess for RV resolution.    Right heart strain with troponin elevation secondary to acute PE. New twave inversions present on EKG. Echo (4/23/2017) showed RV enlargement and reduced function. Nl LV fx with EF 60-65%. Evaluated by cardiology and low risk for CAD other than father with MI at age 37.  -- Trop 0.4-->1.011-->0.870-->and continues down.   -- Appreciate Cardiology recommendations.  -- Repeat echo in 6-8 weeks to assess for RV resolution.  -- RRT overnight for acute chest pain. EKG with more prominent T wave inversion; no ST elevation and otherwise overall unchanged.      DVT Prophylaxis: IV heparin  Code Status: Full Code    This patient was discussed with Dr. Edmond of the Hospitalist Service who agrees with current plans as outlined above.    Disposition: Expected discharge  tmrcheli.  JoAnna K. Barthell, PA-C    Interval History    -Strong family hx colon cancer. Had reportedly nl colonoscopy 12/2016. No weight gain/loss.  -RRT overnight for acute chest pain. No ST elevation and trops continue to trend down. Continues with left sided pleuritic chest pressure. No difficulty breathing on room air with ambulation.  -Mild tachycardia. No hypoxia on room air.    -Data reviewed today: I reviewed all new labs and imaging results over the last 24 hours. I personally reviewed no images or EKG's today.    Physical Exam   Temp: 97.7  F (36.5  C) Temp src: Oral BP: 118/75 Pulse: 102   Resp: 16 SpO2: 95 % O2 Device: None (Room air)    Vitals:    04/23/17 1718   Weight: 88.9 kg (196 lb)     Vital Signs with Ranges  Temp:  [97.7  F (36.5  C)-98.1  F (36.7  C)] 97.7  F (36.5  C)  Pulse:  [] 102  Resp:  [16-18] 16  BP: (117-127)/(75-81) 118/75  SpO2:  [94 %-98 %] 95 %  I/O last 3 completed shifts:  In: 500 [P.O.:500]  Out: -     Constitutional: Pleasant female appears stated age. Looks comfortable sitting upright in bed.  Respiratory: Breath sounds distant. No increased work of breathing on room air.  Cardiovascular: Mild tachycardia. No rub or murmur. No peripheral edema.  GI: Soft, non-tender, non-distended. Bowel sounds present.  Skin/Integumen: Warm, dry, no rashes or lesions.    Medications     HEParin 1,150 Units/hr (04/24/17 0451)     - MEDICATION INSTRUCTIONS -       NaCl 75 mL/hr (04/23/17 1832)       sodium chloride (PF)  10 mL Intracatheter Q8H     sodium chloride (PF)  3 mL Intracatheter Q8H       Data     Recent Labs  Lab 04/24/17  1048 04/24/17  0629 04/24/17  0325  04/23/17  1835   WBC  --  9.4  --   --  11.9*   HGB  --  12.5  --   --  13.9   MCV  --  86  --   --  85   PLT  --  165  --   --  196   INR  --   --   --   --  1.17*   NA  --  141  --   --   --    POTASSIUM  --  3.6  --   --   --    CHLORIDE  --  111*  --   --   --    CO2  --  21  --   --   --    BUN  --  11  --   --   --     CR  --  0.53  --   --  0.60   ANIONGAP  --  9  --   --   --    MATEO  --  7.8*  --   --   --    GLC  --  93  --   --   --    ALBUMIN  --  3.1*  --   --   --    PROTTOTAL  --  6.3*  --   --   --    BILITOTAL  --  0.6  --   --   --    ALKPHOS  --  65  --   --   --    ALT  --  49  --   --   --    AST  --  38  --   --   --    TROPI 0.394* 0.493* 0.627*  < > 1.011*   < > = values in this interval not displayed.    Imaging:  Recent Results (from the past 24 hour(s))   US Lower Extremity Venous Duplex Bilateral    Narrative    US LOWER EXTREMITY VENOUS DUPLEX BILATERAL   4/24/2017 1:03 AM     HISTORY: Chest pain and shortness of breath. Pulmonary embolus.  Evaluate for DVT.    COMPARISON: None.    FINDINGS: Gray-scale, color and Doppler spectral analysis ultrasound  was performed of the legs. Compression and augmentation imaging was  performed.    There is no evidence for deep venous thrombosis. The veins compress  and augment normally.      Impression    IMPRESSION: No DVT.     MOON HENRY MD

## 2017-04-24 NOTE — PHARMACY
Eliquis #60 copay = $80.  We can offer patient a free trial voucher for the first fill. This pt is also eligible for the  copay assistance card for refills (available to patients with commercial insurance).    Pradaxa #60 copay = $80.    Savaysa is non-formulary and would require a prior authorization.    Xarelto #30 copay = $80.  We can offer patient a free trial voucher for the first fill. This pt is also eligible for the  copay assistance card for refills (available to patients with commercial insurance).    Thank you,  Vinny Davison [facility] Discharge Pharmacy Liaison  [phone]

## 2017-04-24 NOTE — PROGRESS NOTES
Cardiology    Addendum:    Left ventricular systolic function is normal.The visual ejection fraction is  estimated at 60-65%.The transmitral spectral Doppler flow pattern is  suggestive of impaired LV relaxation.  Flattened septum is consistent with RV pressure/volume overload.  The right ventricle is moderately dilated.The right ventricular systolic  function is mild to moderately reduced.  There is mild (1+) tricuspid regurgitation.  Right ventricular systolic pressure could not be approximated due to  inadequate tricuspid regurgitation.  The IVC is dilated and fails to change with respiration, suggesting elevated  central venous pressure.     On direct comparision to echo images dated 06/28/2013 RV appears bigger and  there is decline in RV systolic function.      Recs:    1.  Findings are consistent with acute PE  2.  Repeat echocardiogram in ~ 6-8 weeks  3.  Anticoagulation and w/u per inpatient team  4.  Cardiology will sign off      Marianne Arizmendi MD

## 2017-04-24 NOTE — CONSULTS
REQUESTING PHYSICIAN:  Lv Espinal MD       REASON FOR CONSULTATION:  Pulmonary embolism.      HISTORY OF PRESENT ILLNESS:  Mrs. Leyla Hines is a 41-year-old female with bilateral pulmonary embolism. She developed shortness of breath early morning on Sunday (04/23/2017).  Patient went to bed on Saturday night feeling well.  Sunday early morning, she started to have shortness of breath.  Patient went to Urgent Care in [place].  Multiple investigations were done.  Labs revealed white count of 13.3 with normal hemoglobin and platelet.  Chemistry panel was essentially normal.  Troponin was mildly elevated at 0.40.      CT chest angiogram on 04/23/2017 revealed acute bilateral pulmonary emboli involving scattered segmental and subsegmental pulmonary arteries in all the lobes of both lungs.  Moderate volume of pulmonary embolism.  A small amount involving the bronchus intermedius bifurcation.  There was associated right-sided heart strain.  There was no lymphadenopathy.  No pericardial effusion.      The patient was started on heparin drip and transferred to [facility].  She continues on heparin drip.  Last evening, patient had chest pain.  She feels much better today.  She has minimal chest pain.  Shortness of breath is better.  No bleeding.  The patient has been evaluated by cardiologist.  She had echocardiogram done.  EF of 60-65%.  Septum is flattened, consistent with RV pressure/overload.  Right ventricle is moderately dilated. Lower extremity ultrasound on 04/24/2017 is negative for DVT.     Discussed with her regarding any provoking factor.  Patient had not gone on any long car drive.  No recent flying.  No trauma to the lower extremities.  She has not been on any hormonal or birth control pills.  There are no provoking factors.      Patient has a history of pulmonary embolism in 06/2013.  CT chest on 06/27/2013 for chest discomfort and shortness of breath had revealed right lower lobe pulmonary emboli.   Ultrasound of the leg at that time was negative for any thrombosis.  Patient was on oral contraceptive pill.  Her pulmonary embolism was secondary to OCP.  She was treated with 6 months of warfarin.      REVIEW OF SYSTEMS:  Patient denies any headache.  No dizziness.  No ear pain or sore throat.  No neck pain.  No abdominal pain, nausea or vomiting.  No urinary or bowel complaints.  No bleeding.  No fever, chills or night sweats.  No pain, swelling or redness in the extremities.      ALLERGIES:  Reviewed.      MEDICATIONS:  Reviewed.      PAST MEDICAL HISTORY:   1.  Right lung pulmonary embolism in 06/2013 secondary to OCP.   2.  Restless leg syndrome.   3.  Anxiety.   4.  C-sections.      SOCIAL HISTORY:    -She is .    -No smoking.    -No alcohol use.      FAMILY HISTORY:    -Her father had DVT while he was undergoing chemotherapy for esophageal cancer.    -She has 1 brother who had colon cancer at the age of 49.  He is doing well.      PHYSICAL EXAMINATION:   GENERAL:  She is alert, oriented x3.   VITAL SIGNS:  Reviewed.   Rest of the systems not examined.      LABORATORY DATA:  Reviewed.      -Colonoscopy on 12/30/2016 was normal.      -EGD on 03/09/2017 revealed normal esophagus and duodenum.  There was large amount of food residue in the stomach.      -CT of the abdomen and pelvis on 02/27/2017 was normal.      ASSESSMENT:   1.  A 41-year-old female with unprovoked bilateral pulmonary embolism diagnosed on 04/23/2017.   2.  History of provoked right lung pulmonary embolism diagnosed on 06/27/2013.  It was secondary to oral contraceptive pills.   3.  Other medical problems including restless leg syndrome and anxiety.      RECOMMENDATIONS:    1. I had a long discussion with the patient.  Reviewed the result of the labs and CT scan.  Discussed regarding pulmonary embolism.  Different causes of thrombosis discussed.  Patient does not have any provoking factor.  She had unprovoked pulmonary embolism.       Discussed regarding hypercoagulable workup.  Given the fact that she has recurrent thrombosis, I am going to do a hypercoagulable workup.  We will get protein C, protein S, lupus, anticardiolipin antibody, factor V Leiden mutation, prothrombin gene mutation.  I am not doing antithrombin III as patient is on heparin.  I explained to the patient that there is a possibility that some of these tests might be abnormal as she is on heparin and had an acute blood clot.  We will need to monitor them.      We also discussed regarding malignancy as the cause of thrombosis.  Patient has had multiple investigations in last few months.  CT abdomen and pelvis is negative for malignancy.  EGD and colonoscopy are negative.  CT chest is negative for any malignancy.  We do not need any further radiological investigations.      2. Discussed regarding treatment.  Patient needs lifelong anticoagulation because of this unprovoked bilateral pulmonary emboli.  She is on heparin.  She can be switched to one of the newer oral anticoagulants like Xarelto or Eliquis.      Discussed regarding bleeding complication.  Advised her to avoid trauma.  Advised her to go to ER if she has any head injury.  She will see a physician immediately if she has bleeding from any sites.      3. Patient had multiple questions which were all answered.  I will see her back in the clinic in about 2 weeks for followup.  She will call us if she has any questions or concerns.      Thanks for the consult.      TOTAL TIME SPENT:  40 minutes, more than half time spent in counseling.         RANDI SANDERS MD             D: 2017 16:22   T: 2017 17:14   MT: [id number]      Name:     EMMA SARGENT   MRN:      [id number]        Account:       [id number]   :      1975           Consult Date:  2017      Document: [id number]       cc: Lv Espinal MD

## 2017-04-24 NOTE — H&P
DATE OF SERVICE:  04/23/2017      PRIMARY CARE PROVIDER:  REESE Saleh CNP      CHIEF COMPLAINT:  Shortness of breath.      HISTORY OF PRESENT ILLNESS:  Leyla Hines is a 41-year-old female with a past medical history significant for pulmonary embolism, anxiety, and restless legs syndrome who is a direct admission to [facility] from [facility] [place] [facility] for further evaluation and management of bilateral pulmonary emboli.  The patient states that she awoke suddenly this morning with left-sided chest discomfort, shortness of breath and some dizziness with activity.  She reports that her chest discomfort was about a 2 or 3 at rest up to a 5 with deep inspiration.  Her dyspnea persisted so she decided to present to urgent care for evaluation as she was worried her symptoms were similar to last time that she had a pulmonary embolism.  She was evaluated at urgent care in [place].  At time of presentation, her heart rate was elevated into the 130s.  She was maintaining appropriate oxygen saturations on room air.  Her laboratory evaluation was notable for an elevated anion gap of 16, leukocytosis of 13.3 and elevated troponin of 0.4.  A CT PE protocol was obtained which showed acute bilateral pulmonary emboli with scattered segmental and subsegmental PEs in the pulmonary arteries of all lobes in both lungs.  There was evidence of right heart strain on CT scan as well as EKG.  The patient was started on heparin drip and transferred to [facility] for additional evaluation.  She is presently evaluated in her room on the sixth floor.  She reports that she continues to have some shortness of breath and left-sided pleuritic chest pain, worse with inspiration, she presently rates this about a 5/10.  She is not feeling dizzy or lightheaded at present.  In regards to her chest pain, she says this does not radiate.  There is no associated nausea or diaphoresis.  She has not noticed any unusual lower extremity  edema, discomfort or erythema.  She has had no recent travel, injury, or prolonged immobilization.  She is not a smoker, is not on any hormone therapy.  In regards to her prior pulmonary embolism in , this was thought to be secondary to OCP use.  At that time she was committed for 6 months and then discontinued this.      REVIEW OF SYSTEMS:  A 10-point review of systems is negative aside from the information in the HPI.      PAST MEDICAL HISTORY:   1.  Pulmonary embolism.  This occurred in  and was thought secondary to OCP use.  Her lower extremity Dopplers were negative.  At that time she was discharged on Lovenox and warfarin, which she completed a course for 6 months.   2.  Anxiety.   3.  Restless legs syndrome.      PAST SURGICAL HISTORY:   1.  EGD.   2.  Colonoscopy 2016, this was done due to a family history of early colon cancer and was normal.   3.   x3.      ALLERGIES:  Betadine.      PRIOR TO ADMISSION MEDICATIONS:    No prescriptions prior to admission.        SOCIAL HISTORY:  The patient denies alcohol or drug use.  She has no smoking history.  She is  with 4 children.      FAMILY HISTORY:  Her father had a DVT in his 60s while on chemotherapy.  Her father also has an early history of coronary artery disease and had his first MI at age 37.      LABORATORY DATA:  Sodium 145, potassium 4.1, chloride 107, CO2 22, BUN 15, creatinine 0.6, ALT is 66, AST is elevated at 73, anion gap is 16, troponin 0.4.  White blood cell count is 13.3, hemoglobin 15.8, hematocrit 45 and platelets are 250,000.      IMAGING:  A CT of the chest, PE protocol.  This showed acute bilateral pulmonary emboli involving scattered segmental and subsegmental pulmonary arteries in all lobes of both lungs overall moderate volume.  Small amount involves the bronchus and intermediate bifurcation.  Associated right-sided heart strain suggested with flattening of the interventricular cardiac symptoms and an RV/LV  ratio greater than 1, negative for thoracic dissection and aneurysm.        EKG is located in the patient's paper chart.  This showed T-wave inversions in lead III, aVF, V1 through V4.      PHYSICAL EXAMINATION:   VITAL SIGNS:  Temperature 98.1, heart rate 106, blood pressure 120/80, respiratory rate 16, oxygen saturation 98% on room air.   GENERAL:  Alert and oriented female sitting up in bed, appears comfortable, though mildly anxious and is pleasantly conversant.   HEENT:  Pupils are equal and reactive to light, EOMI.   ENT:  Mucous membranes are moist.   CARDIOVASCULAR:  Regular rhythm, mildly tachycardic, no murmurs appreciated.   RESPIRATORY:  Lungs are clear bilaterally, no increased work of breathing or wheezing.   GASTROINTESTINAL:  Positive bowel sounds.  His abdomen was soft and nontender.   SKIN:  Warm and dry.   EXTREMITIES:  Lower extremities without edema, erythema or tenderness to palpation.   NEUROLOGIC:  Cranial nerves II through XII are grossly intact.  No focal deficits.   MUSCULOSKELETAL:  The patient moves all 4 extremities.      ASSESSMENT AND PLAN:  Leyla Hines is a 47-year-old female with a past medical history significant for a prior pulmonary embolism and anxiety and restless legs syndrome who is a direct admission to [facility] from [facility] for further evaluation and management of bilateral pulmonary emboli with evidence of right heart strain.  She is being admitted under inpatient status as we anticipate greater than a 2-midnight stay.      1.  Bilateral pulmonary emboli with evidence of right heart strain.  The patient presented with 1 day of shortness of breath, dizziness, and a left-sided pleuritic chest pain.  CT scan at urgent care showed acute bilateral pulmonary emboli affecting all lobes of both lungs with an overall moderate volume.  There was also some evidence of right heart strain on CT scan as well as EKG.  She has a history of prior PE in 2013 that  was thought secondary to OCP use at the time.  She completed a 6-month course of warfarin.  She currently does not have any risk factors for clots.  She is not a smoker, not currently on any hormones, has no recent travel or injury.  She has no known coagulopathies.  On arrival she is mildly tachycardic but is maintaining appropriate oxygen saturations on room air and otherwise hemodynamically stable with a normal blood pressure.   -- Will continue IV heparin started at urgent care for now.   -- Pharmacy liaison consult to check NOAC coverage.  The patient would like to avoid Coumadin if possible.   -- Hematology consult placed per patient request.   -- We will hold off on any hypercoagulability workup at this time and await Hematology recommendations.   -- Monitor patient on telemetry with continuous pulse oximetry.   -- Will obtain echocardiogram.   -- Will obtain ultrasound of bilateral lower extremities.     2. Elevated troponin, likely secondary to acute PE. Initial troponin at UC 0.4, subsequent up to 1.011. New twave inversions present on EKG.   --Cardiology consulted, appreciate the assistance   -- trend troponin overnight      3.  Elevated anion gap.  Anion gap mildly elevated at 16 on admission, unclear etiology.  Remainder BMP largely unremarkable. Will hydrate with gentle IV fluids and recheck in the a.m.      4.  Leukocytosis.  The patient has mild leukocytosis of 13.3 on admission.  Anticipate this is likely a stress response as there is no evidence of acute infection.   -- Recheck in the a.m.      5.  Deep venous thrombosis prophylaxis:  Continue IV heparin.      CODE STATUS:  The patient is full code.      The patient was seen and examined with Dr. Bustillos who agrees with the above plan.         SACHI STEWART MD       As dictated by TYREE MORENO PA-C            D: 04/23/2017 18:46   T: 04/23/2017 19:40   MT: LQ      Name:     EMMA SARGENT   MRN:      [id number]        Account:      [id number]    :      1975           Admitted:     [id number]      Document: [id number]       cc: Radha LEIGH CNP

## 2017-04-24 NOTE — PROGRESS NOTES
Consult dictated.    41-year-old female admitted with bilateral unprovoked pulmonary embolism.  Patient has a history of provoked thrombosis in 2013 from OCP.    Plan:  -Hypercoagulable workup ordered.  -Lifelong anticoagulation.  Patient can be switched to Xarleto or eliquis.  -Follow-up in hematologic clinic in about two weeks' time.

## 2017-04-24 NOTE — PLAN OF CARE
Problem: Goal Outcome Summary  Goal: Goal Outcome Summary  Outcome: No Change  Pt AOx4. VSS on RA, however pt reports decreased pain with 2lpm oxygen. Pain in left chest 6/10 reduced to 3/10 with percocet. IVF at 75 ml/hr heparin at 11.5 ml/hr. Hep10a recheck in for 11am. Pt up independent in room. Will continue to monitor.

## 2017-04-24 NOTE — PLAN OF CARE
Problem: Goal Outcome Summary  Goal: Goal Outcome Summary  7pm-11p. Patient c/o crushing pain in left upper chest, c/o of SOB. Previously pain was with deep inspiratory respiration, now it's ongoing per pt. RRT called. EKG done, nitro x1 with some relief. VSS, except tachycardic 100's at times on RA. Regular diet. Hgt at 1350 U/hr, bolus given per order. Percocet x1 with some relief. NS at 75 ml/hr.  Hep 10a at 1:30am today. Trop elevated.

## 2017-04-24 NOTE — PLAN OF CARE
Problem: Goal Outcome Summary  Goal: Goal Outcome Summary  Outcome: No Change  Patient arrived from [facility] per ambulance.  Received report from Luis at [facility].  Patient c/o pain in left upper chest, percocet given, lungs clear diminished.  VSS, except tachycardic 100's.  On room air 98%.  Up independent to bathroom, voids in bathroom.  Tolerating regular diet.  Tearful after family visited, unable to go home with them and worried about her health.

## 2017-04-25 VITALS
BODY MASS INDEX: 29.7 KG/M2 | OXYGEN SATURATION: 98 % | RESPIRATION RATE: 16 BRPM | WEIGHT: 196 LBS | TEMPERATURE: 97.9 F | SYSTOLIC BLOOD PRESSURE: 113 MMHG | HEIGHT: 68 IN | HEART RATE: 93 BPM | DIASTOLIC BLOOD PRESSURE: 79 MMHG

## 2017-04-25 LAB
PROT C ACT/NOR PPP CHRO: 71 % (ref 70–170)
PROT S FREE AG ACT/NOR PPP IA: 73 % (ref 55–125)

## 2017-04-25 PROCEDURE — 25000132 ZZH RX MED GY IP 250 OP 250 PS 637: Performed by: PHYSICIAN ASSISTANT

## 2017-04-25 PROCEDURE — 99239 HOSP IP/OBS DSCHRG MGMT >30: CPT | Performed by: PHYSICIAN ASSISTANT

## 2017-04-25 RX ORDER — OXYCODONE AND ACETAMINOPHEN 5; 325 MG/1; MG/1
1-2 TABLET ORAL EVERY 6 HOURS PRN
Qty: 12 TABLET | Refills: 0 | Status: SHIPPED | OUTPATIENT
Start: 2017-04-25

## 2017-04-25 RX ADMIN — OXYCODONE HYDROCHLORIDE AND ACETAMINOPHEN 1 TABLET: 5; 325 TABLET ORAL at 07:57

## 2017-04-25 RX ADMIN — RIVAROXABAN 15 MG: 15 TABLET, FILM COATED ORAL at 07:57

## 2017-04-25 NOTE — PLAN OF CARE
Problem: Goal Outcome Summary  Goal: Goal Outcome Summary  Outcome: Adequate for Discharge Date Met:  04/25/17  Pt. VSS on RA. Tele NSR. Independent. Discharge instructions reviewed with patient. Pt. Verbalized understanding. Medications sent home with patient.

## 2017-04-25 NOTE — PLAN OF CARE
Problem: Goal Outcome Summary  Goal: Goal Outcome Summary  Outcome: No Change  Afebrile, perocet given for chest pain-effective. Heparin drip stopped at 6 pm, Xarelto scheduled to be given at 8 pm tonight. Ambulated in the halls independently.

## 2017-04-25 NOTE — DISCHARGE SUMMARY
[facility]    Discharge Summary  Hospitalist    Date of Admission:  4/23/2017  Date of Discharge:  4/25/2017  1:54 PM  Discharging Provider: JoAnna K. Barthell, PA-C    Discharge Diagnoses   Bilateral pulmonary embolism with acute cor pulmonale    History of Present Illness   Leyla Hines is a 47-year-old female with history of anxiety and prior presumed provoked pulmonary embolism from OCPs who was directly admitted from [facility] after she was found to have acute bilateral pulmonary emboli with evidence of right heart strain.    Please see H&P by Nevin Barger from 4/23/2017 for complete details of admission.     Hospital Course   Leyla Hines was admitted on 4/23/2017.  The following problems were addressed during her hospitalization:     Unprovoked bilateral pulmonary emboli with acute cor pulmonale. Presented with left-sided pleuritic chest pain and SOB x 1 day. CT PE showed acute bilateral pulmonary emboli affecting all lobes of both lungs with an overall moderate volume. EKG showed new T wave inversions in leads III, aVF, V1-V4 and CT findings consistent with right heart strain. Ultrasound bilateral LE negative. Echo (4/23/2017) showed RV enlargement and reduced function. Nl LV fx with EF 60-65%. Hx PE thought provoked from OCPs (2013) and completed 6-months warfarin then. No other known risk factors for VTE - non smoker, no exogenous estrogen use, no recent travel or injury,no known coagulopathies. Family hx colon cancer, had negative colonoscopy 4 months ago 12/2016. No hypoxia or difficulty breathing on room air, mild tachycardia 90-low 100s on day of discharge.  -- Evaluated by cardiology for initially elevated, but downtrending trops who agreed elevation likely from right heart strain. Repeat echo 6-8 wks.  -- Evaluated by hematology; hypercoag work up pending.  -- On 4/24, transitioned from heparin gtt to Xarelto 15mg BID x 3 weeks, then 20mg daily.  -- Follow up with hematology   Velasquez in 2 weeks.  -- Follow up with Radha Kelly in 1 week. Order for CBC before appt placed. Repeat echo in 6-8 weeks to assess for resolution of RV dysfunction (outpt echo order placed for [facility]).     This patient was discussed with Dr. Edmond of the Hospitalist Service who agrees with current plans as outlined above.    JoAnna K. Barthell, PA-C    Significant Results and Procedures   CT PE from outside records 4/23/2017:  Acute bilateral pulmonary emboli involving scattered segmental and subsegmental pulmonary arteries in all lobes of both lungs overall moderate volume. Small amount involves the bronchus and intermediate bifurcation. Associated right-sided heart strain suggested with flattening of the interventricular cardiac symptoms and an RV/LV ratio greater than 1, negative for thoracic dissection and aneurysm.    Pending Results   These results will be followed up by Dr. Eng with hematology.  Unresulted Labs Ordered in the Past 30 Days of this Admission     Date and Time Order Name Status Description    4/25/2017 1057 Factor 2 and 5 mutation analysis In process     4/24/2017 1325 Lupus panel In process     4/24/2017 1325 Factor 5 leiden mutation analysis In process     4/24/2017 1325 F2 prothrombin 19724N Mut Anal In process           Code Status   Full Code       Primary Care Physician   Radha Kelly    Physical Exam   Temp: 97.9  F (36.6  C) Temp src: Oral BP: 113/79 Pulse: 93   Resp: 16 SpO2: 98 % O2 Device: None (Room air)    Vitals:    04/23/17 1718   Weight: 88.9 kg (196 lb)     Vital Signs with Ranges  Temp:  [97.9  F (36.6  C)] 97.9  F (36.6  C)  Pulse:  [65-93] 93  Resp:  [15-16] 16  BP: (111-113)/(74-79) 113/79  SpO2:  [98 %] 98 %     Constitutional: Pleasant female appears stated age. Looks comfortable sitting upright in bed.  Respiratory: Breath sounds clear. No increased work of breathing on room air.  Cardiovascular: RRR. No rub or murmur. No peripheral  edema.  Skin/Integumen: Warm, dry, no rashes or lesions.    Discharge Disposition   Discharged to home  Condition at discharge: Stable    Consultations This Hospital Stay   PHARMACY TO DOSE HEPARIN  PHARMACY LIAISON FOR MEDICATION COVERAGE CONSULT  HEMATOLOGY & ONCOLOGY IP CONSULT  CARDIOLOGY IP CONSULT    Time Spent on this Encounter   I, JoAnna K. Barthell, personally saw the patient today and spent greater than 30 minutes discharging this patient.    Discharge Orders     **CBC with platelets FUTURE 14d   Last Lab Result: Hemoglobin (g/dL)      Date                     Value                04/24/2017               12.5             ----------     Reason for your hospital stay   Further evaluation and management of blood clots in lungs.     Activity   Your activity upon discharge: activity as tolerated     Follow-up and recommended labs and tests    Follow up with primary care provider, Radha Kelly, within 7 days for hospital follow- up.  The following labs/tests are recommended: CBC as started on Xarelto.  Repeat echo in 6-8 weeks.    Follow up with Dr. Eng in 2 weeks. Call [phone] to schedule.     Full Code     Echocardiogram Complete     Diet   Follow this diet upon discharge:    Regular Diet Adult       Discharge Medications   Discharge Medication List as of 4/25/2017  1:13 PM      START taking these medications    Details   oxyCODONE-acetaminophen (PERCOCET) 5-325 MG per tablet Take 1-2 tablets by mouth every 6 hours as needed for moderate to severe pain, Disp-12 tablet, R-0, Local Print      rivaroxaban ANTICOAGULANT (XARELTO) 15 MG TABS tablet Take 1 tablet (15 mg) by mouth 2 times daily (with meals) for 21 days, Disp-42 tablet, R-0, E-PrescribePlease print free trial voucher and coordinate  copay assistance card for refills - thanks           Allergies   Allergies   Allergen Reactions     Betadine [Povidone Iodine]      Data   Most Recent 3 CBC's:  Recent Labs   Lab Test   04/24/17   0629  04/23/17   1835  03/02/17   1648   WBC  9.4  11.9*  7.7   HGB  12.5  13.9  13.2   MCV  86  85  88   PLT  165  196  285      Most Recent 3 BMP's:  Recent Labs   Lab Test  04/24/17   0629  04/23/17   1835  03/03/17   1159  11/25/16   0940  06/28/13   0730   NA  141   --   139   --   138   POTASSIUM  3.6   --   3.8   --   4.2   CHLORIDE  111*   --   105   --   104   CO2  21   --   27   --   22   BUN  11   --   6*   --   6   CR  0.53  0.60  0.62   --   0.68   ANIONGAP  9   --   7   --   12   MATEO  7.8*   --   8.9   --   8.6   GLC  93   --   88  96  99     Most Recent 2 LFT's:  Recent Labs   Lab Test  04/24/17   0629  03/03/17   1159   AST  38  23   ALT  49  54*   ALKPHOS  65  67   BILITOTAL  0.6  0.3     Most Recent INR's and Anticoagulation Dosing History:  Anticoagulation Dose History     Recent Dosing and Labs Latest Ref Rng & Units 9/24/2013 10/1/2013 10/7/2013 11/5/2013 12/11/2013 12/19/2013 4/23/2017    INR 0.86 - 1.14 - - - - - - 1.17(H)    INR 0.86 - 1.14 3.3(A) 4.4(A) 2.4(A) 2.7(A) 3.6(A) 1.7(A) -        Most Recent 3 Troponin's:  Recent Labs   Lab Test  04/24/17   1048  04/24/17   0629  04/24/17   0325   TROPI  0.394*  0.493*  0.627*     Most Recent Cholesterol Panel:  Recent Labs   Lab Test  11/25/16   0940   CHOL  145   LDL  63   HDL  75   TRIG  35     Most Recent 6 Bacteria Isolates From Any Culture (See EPIC Reports for Culture Details):  Recent Labs   Lab Test  09/20/11   0100   CULT  10 to 50,000 colonies/mL Mixed gram positive grecia Multiple species present, probable perineal contamination. Susceptibility testing not routinely done     Most Recent TSH, T4 and A1c Labs:  Recent Labs   Lab Test  11/25/16   0940   TSH  1.46     Results for orders placed or performed during the hospital encounter of 04/23/17   US Lower Extremity Venous Duplex Bilateral    Narrative    US LOWER EXTREMITY VENOUS DUPLEX BILATERAL   4/24/2017 1:03 AM     HISTORY: Chest pain and shortness of breath. Pulmonary  embolus.  Evaluate for DVT.    COMPARISON: None.    FINDINGS: Gray-scale, color and Doppler spectral analysis ultrasound  was performed of the legs. Compression and augmentation imaging was  performed.    There is no evidence for deep venous thrombosis. The veins compress  and augment normally.      Impression    IMPRESSION: No DVT.     MOON HENRY MD

## 2017-04-25 NOTE — PLAN OF CARE
Problem: Goal Outcome Summary  Goal: Goal Outcome Summary  Outcome: No Change  Pt A&O x4. VSS on RA. No c/o of chest pain. Xarelto given. Independent. Regular diet. D/c planning today.

## 2017-04-25 NOTE — DISCHARGE INSTRUCTIONS
Start Xarelto 1 tablet twice daily x 3 weeks. After that will take 1 tablet daily (this will need to be filled by Dr. Eng or primary provider).    Percocet 1-2 tablets every 6 hours as needed for pain. This can be constipating. Remain active and increase water intake. Start over the counter stool softener if no bowel movement after 2-3 days.    Follow up with primary care provider, Radha Kelly, within 7 days for hospital follow- up.  Lab draw beforehand. Repeat echo in 6-8 weeks.    Follow up with Dr. Eng in 2 weeks. Call [phone] to schedule.    Present to ED if trauma to head or persistent bleeding while on blood thinner.

## 2017-04-26 ENCOUNTER — CARE COORDINATION (OUTPATIENT)
Dept: CARDIOLOGY | Facility: CLINIC | Age: 42
End: 2017-04-26

## 2017-04-26 DIAGNOSIS — I26.99 PULMONARY EMBOLISM (H): Primary | ICD-10-CM

## 2017-04-26 LAB
INTERPRETATION ECG - MUSE: NORMAL
LA PPP-IMP: NORMAL

## 2017-04-26 NOTE — PROGRESS NOTES
Called patient and left  to discuss any post hospital d/c questions she may have, review medication changes, and confirm f/u appts. RN advised patient in  that we would like to schedule her for an ECHO in 6-8weeks and f/u apt with JHONATAN following ECHO. Patient advised in  to call clinic with any cardiac related questions or concerns and to schedule ECHO and f/u apt. Orders placed in [facility] and RN provided phone number for scheduling in .

## 2017-04-28 LAB — COPATH REPORT: NORMAL

## 2017-05-03 NOTE — PATIENT INSTRUCTIONS
-- Follow up with hematology Dr. Eng in 1 week.  -- Repeat echo in 6-8 weeks to assess for resolution of RV dysfunction. [phone]

## 2017-05-04 ENCOUNTER — OFFICE VISIT (OUTPATIENT)
Dept: PEDIATRICS | Facility: CLINIC | Age: 42
End: 2017-05-04
Payer: COMMERCIAL

## 2017-05-04 VITALS
HEART RATE: 91 BPM | HEIGHT: 68 IN | BODY MASS INDEX: 30.48 KG/M2 | SYSTOLIC BLOOD PRESSURE: 114 MMHG | TEMPERATURE: 97.7 F | OXYGEN SATURATION: 99 % | DIASTOLIC BLOOD PRESSURE: 68 MMHG | WEIGHT: 201.1 LBS

## 2017-05-04 DIAGNOSIS — Z79.01 LONG TERM CURRENT USE OF ANTICOAGULANT THERAPY: ICD-10-CM

## 2017-05-04 DIAGNOSIS — I26.99 BILATERAL PULMONARY EMBOLISM (H): Primary | ICD-10-CM

## 2017-05-04 LAB
BASOPHILS # BLD AUTO: 0.1 10E9/L (ref 0–0.2)
BASOPHILS NFR BLD AUTO: 0.9 %
DIFFERENTIAL METHOD BLD: NORMAL
EOSINOPHIL # BLD AUTO: 0.2 10E9/L (ref 0–0.7)
EOSINOPHIL NFR BLD AUTO: 2.8 %
ERYTHROCYTE [DISTWIDTH] IN BLOOD BY AUTOMATED COUNT: 13.2 % (ref 10–15)
HCT VFR BLD AUTO: 37.8 % (ref 35–47)
HGB BLD-MCNC: 12.4 G/DL (ref 11.7–15.7)
LYMPHOCYTES # BLD AUTO: 1.3 10E9/L (ref 0.8–5.3)
LYMPHOCYTES NFR BLD AUTO: 24.9 %
MCH RBC QN AUTO: 28.7 PG (ref 26.5–33)
MCHC RBC AUTO-ENTMCNC: 32.8 G/DL (ref 31.5–36.5)
MCV RBC AUTO: 88 FL (ref 78–100)
MONOCYTES # BLD AUTO: 0.7 10E9/L (ref 0–1.3)
MONOCYTES NFR BLD AUTO: 12.4 %
NEUTROPHILS # BLD AUTO: 3.1 10E9/L (ref 1.6–8.3)
NEUTROPHILS NFR BLD AUTO: 59 %
PLATELET # BLD AUTO: 306 10E9/L (ref 150–450)
RBC # BLD AUTO: 4.32 10E12/L (ref 3.8–5.2)
WBC # BLD AUTO: 5.3 10E9/L (ref 4–11)

## 2017-05-04 PROCEDURE — 36415 COLL VENOUS BLD VENIPUNCTURE: CPT | Performed by: NURSE PRACTITIONER

## 2017-05-04 PROCEDURE — 99214 OFFICE O/P EST MOD 30 MIN: CPT | Performed by: NURSE PRACTITIONER

## 2017-05-04 PROCEDURE — 85025 COMPLETE CBC W/AUTO DIFF WBC: CPT | Performed by: NURSE PRACTITIONER

## 2017-05-04 NOTE — PROGRESS NOTES
Let her know that there is no factor V Leiden mutation or prothrombin gene mutation.  This is good.    Jonelle Eng

## 2017-05-04 NOTE — NURSING NOTE
"Chief Complaint   Patient presents with     Hospital F/U       Initial /68 (Cuff Size: Adult Large)  Pulse 91  Temp 97.7  F (36.5  C) (Tympanic)  Ht 5' 8\" (1.727 m)  Wt 201 lb 1.6 oz (91.2 kg)  SpO2 99%  BMI 30.58 kg/m2 Estimated body mass index is 30.58 kg/(m^2) as calculated from the following:    Height as of this encounter: 5' 8\" (1.727 m).    Weight as of this encounter: 201 lb 1.6 oz (91.2 kg).  Medication Reconciliation: complete   Leyla Dobbins CMA    "

## 2017-05-04 NOTE — LETTER
[facility]  [address] [place] [address]                  [phone]   May 4, 2017    Leyla Hines  [address] [address]        Hi Leyla,    Emma hemoglobin is stable.    All the best,    Radha Kelly, LINDENP-DNP.          Results for orders placed or performed in visit on 05/04/17   CBC with platelets differential   Result Value Ref Range    WBC 5.3 4.0 - 11.0 10e9/L    RBC Count 4.32 3.8 - 5.2 10e12/L    Hemoglobin 12.4 11.7 - 15.7 g/dL    Hematocrit 37.8 35.0 - 47.0 %    MCV 88 78 - 100 fl    MCH 28.7 26.5 - 33.0 pg    MCHC 32.8 31.5 - 36.5 g/dL    RDW 13.2 10.0 - 15.0 %    Platelet Count 306 150 - 450 10e9/L    Diff Method Automated Method     % Neutrophils 59.0 %    % Lymphocytes 24.9 %    % Monocytes 12.4 %    % Eosinophils 2.8 %    % Basophils 0.9 %    Absolute Neutrophil 3.1 1.6 - 8.3 10e9/L    Absolute Lymphocytes 1.3 0.8 - 5.3 10e9/L    Absolute Monocytes 0.7 0.0 - 1.3 10e9/L    Absolute Eosinophils 0.2 0.0 - 0.7 10e9/L    Absolute Basophils 0.1 0.0 - 0.2 10e9/L

## 2017-05-04 NOTE — MR AVS SNAPSHOT
After Visit Summary   5/4/2017    Leyla Hines    MRN: [id number]           Patient Information     Date Of Birth          1975        Visit Information        Provider Department      5/4/2017 9:40 AM Radha Kelly APRN CNP [facility]        Today's Diagnoses     Bilateral pulmonary embolism (H)    -  1      Care Instructions    -- Follow up with hematology Dr. Eng in 1 week.  -- Repeat echo in 6-8 weeks to assess for resolution of RV dysfunction. [phone]          Follow-ups after your visit        Your next 10 appointments already scheduled     May 09, 2017 10:00 AM CDT   Return Visit with Jonelle Eng MD   [facility] ([facility])    [facility]  [address]  [facility] [address] [phone]              Future tests that were ordered for you today     Open Future Orders        Priority Expected Expires Ordered    Echocardiogram Complete Routine  8/4/2017 5/4/2017            Who to contact     If you have questions or need follow up information about today's clinic visit or your schedule please contact [facility] directly at [phone].  Normal or non-critical lab and imaging results will be communicated to you by MyChart, letter or phone within 4 business days after the clinic has received the results. If you do not hear from us within 7 days, please contact the clinic through Kodkodhart or phone. If you have a critical or abnormal lab result, we will notify you by phone as soon as possible.  Submit refill requests through Social Bicycles or call your pharmacy and they will forward the refill request to us. Please allow 3 business days for your refill to be completed.          Additional Information About Your Visit        MyChart Information     Social Bicycles lets you send messages to your doctor, view your test results, renew your prescriptions, schedule appointments and more. To sign up, go to  [email or web address] . Click on \"Log in\" on the left side of the screen, which will take you to the Welcome page. Then click on \"Sign up Now\" on the right side of the page.     You will be asked to enter the access code listed below, as well as some personal information. Please follow the directions to create your username and password.     Your access code is: [email or web address]  Expires: 2017  6:23 PM     Your access code will  in 90 days. If you need help or a new code, please call your [place] clinic or [phone].        Care EveryWhere ID     This is your Care EveryWhere ID. This could be used by other organizations to access your [place] medical records  [id number]        Your Vitals Were     Pulse Temperature Height Pulse Oximetry BMI (Body Mass Index)       91 97.7  F (36.5  C) (Tympanic) 5' 8\" (1.727 m) 99% 30.58 kg/m2        Blood Pressure from Last 3 Encounters:   17 114/68   17 113/79   17 110/70    Weight from Last 3 Encounters:   17 201 lb 1.6 oz (91.2 kg)   17 196 lb (88.9 kg)   17 194 lb 7 oz (88.2 kg)              We Performed the Following     CBC with platelets differential        Primary Care Provider Office Phone # Fax #    REESE Sylvester [facility] [phone]       [facility] [address] [place] [address]        Thank you!     Thank you for choosing [facility]  for your care. Our goal is always to provide you with excellent care. Hearing back from our patients is one way we can continue to improve our services. Please take a few minutes to complete the written survey that you may receive in the mail after your visit with us. Thank you!             Your Updated Medication List - Protect others around you: Learn how to safely use, store and throw away your medicines at www.disposemymeds.org.          This list is accurate as of: 17 10:01 AM.  Always use your most recent med list.    "                Brand Name Dispense Instructions for use    oxyCODONE-acetaminophen 5-325 MG per tablet    PERCOCET    12 tablet    Take 1-2 tablets by mouth every 6 hours as needed for moderate to severe pain       rivaroxaban ANTICOAGULANT 15 MG Tabs tablet    XARELTO    42 tablet    Take 1 tablet (15 mg) by mouth 2 times daily (with meals) for 21 days

## 2017-05-04 NOTE — PROGRESS NOTES
"  SUBJECTIVE:                                                    Leyla Hines is a 41 year old female who presents to clinic today for the following health issues:    Hospital Follow-up Visit:    Hospital/Nursing Home/IP Rehab Facility: [facility]  Date of Admission: 4/23/17  Date of Discharge: 4/25/17  Reason(s) for Admission: pulmonary embolism            Problems taking medications regularly:  None - taking Xarelto       Medication changes since discharge: None       Problems adhering to non-medication therapy:  None    Summary of hospitalization:  [facility] discharge summary reviewed  Diagnostic Tests/Treatments reviewed.  Follow up needed: none  Other Healthcare Providers Involved in Patient s Care:         None  Update since discharge: improved.     Post Discharge Medication Reconciliation: discharge medications reconciled, continue medications without change.  Plan of care communicated with patient     Coding guidelines for this visit:  Type of Medical   Decision Making Face-to-Face Visit       within 7 Days of discharge Face-to-Face Visit        within 14 days of discharge   Moderate Complexity [id number]   High Complexity [id number]          Previous PE 2013, thought to be related to OCPs. Was on warfarin x 6 months. Has stayed away from hormonal contraception.  Recently hospitalized for 2 days for bilateral severe PEs with right heart strain.  Started on Xarelto. Feeling much better. No shortness of breath or chest pain.    ROS: const/cv/resp otherwise negative     OBJECTIVE:  /68 (Cuff Size: Adult Large)  Pulse 91  Temp 97.7  F (36.5  C) (Tympanic)  Ht 5' 8\" (1.727 m)  Wt 201 lb 1.6 oz (91.2 kg)  SpO2 99%  BMI 30.58 kg/m2  CONSTITUTIONAL: Alert, well-nourished, well-groomed, NAD  RESP: Lungs CTA. No wheeze, rhonchi, rales.  CV: HRRR S1 S2 No MRG. No peripheral edema      ASSESSMENT/PLAN:  (I26.99) Bilateral pulmonary embolism (H)  (primary encounter diagnosis)  Comment: " Sx much improved after starting Xarelto. Will likely need lifetime anticoagulation given that this is her second episode.   Plan: CBC with platelets differential today.        Repeat echo 6-8 weeks from time of discharge. Orders placed.         Again discussed r/b/se of Xarelto. No reversal agent. Discussed avoiding trauma.         F/U heme next week. Will discuss coag testing at that time.        Radha Kelly, TARA-ROSALINDA.

## 2017-05-09 ENCOUNTER — ONCOLOGY VISIT (OUTPATIENT)
Dept: ONCOLOGY | Facility: CLINIC | Age: 42
End: 2017-05-09
Attending: INTERNAL MEDICINE
Payer: COMMERCIAL

## 2017-05-09 VITALS
BODY MASS INDEX: 30.38 KG/M2 | TEMPERATURE: 98.1 F | WEIGHT: 199.8 LBS | RESPIRATION RATE: 16 BRPM | OXYGEN SATURATION: 99 % | SYSTOLIC BLOOD PRESSURE: 111 MMHG | HEART RATE: 80 BPM | DIASTOLIC BLOOD PRESSURE: 74 MMHG

## 2017-05-09 DIAGNOSIS — R10.13 ABDOMINAL PAIN, EPIGASTRIC: ICD-10-CM

## 2017-05-09 DIAGNOSIS — I26.99 BILATERAL PULMONARY EMBOLISM (H): Primary | ICD-10-CM

## 2017-05-09 PROCEDURE — 99213 OFFICE O/P EST LOW 20 MIN: CPT | Performed by: INTERNAL MEDICINE

## 2017-05-09 PROCEDURE — 99211 OFF/OP EST MAY X REQ PHY/QHP: CPT

## 2017-05-09 ASSESSMENT — PAIN SCALES - GENERAL: PAINLEVEL: NO PAIN (0)

## 2017-05-09 NOTE — MR AVS SNAPSHOT
After Visit Summary   5/9/2017    Leyla Hines    MRN: [id number]           Patient Information     Date Of Birth          1975        Visit Information        Provider Department      5/9/2017 10:00 AM Jonelle Eng MD [facility]        Today's Diagnoses     Bilateral pulmonary embolism (H)    -  1    Abdominal pain, epigastric          Care Instructions    CT abdomen and pelvis. Will call her with the result.  Continue xarelto.  Follow up in 1 year.        Follow-ups after your visit        Your next 10 appointments already scheduled     May 25, 2017 10:30 AM CDT   CT ABDOMEN PELVIS W CONTRAST with SHCT1   [facility] ([facility])    [address] [address] [phone]           Please bring any scans or X-rays taken at other hospitals, if similar tests were done. Also bring a list of your medicines, including vitamins, minerals and over-the-counter drugs. It is safest to leave personal items at home.  Be sure to tell your doctor:   If you have any allergies.   If there s any chance you are pregnant.   If you are breastfeeding.   If you have any special needs.  You may have contrast for this exam. To prepare:   Do not eat or drink for 2 hours before your exam. If you need to take medicine, you may take it with small sips of water. (We may ask you to take liquid medicine as well.)   The day before your exam, drink extra fluids at least six 8-ounce glasses (unless your doctor tells you to restrict your fluids).  Patients over 70 or patients with diabetes or kidney problems:   If you haven t had a blood test (creatinine test) within the last 30 days, go to your clinic or Diagnostic Imaging Department for this test.  If you have diabetes:   If your kidney function is normal, continue taking your metformin (Avandamet, Glucophage, Glucovance, Metaglip) on the day of your exam.   If your kidney function is abnormal, wait 48 hours before  "restarting this medicine.  You will have oral contrast for this exam:   You will drink the contrast at home. Get this from your clinic or Diagnostic Imaging Department. Please follow the directions given.  Please wear loose clothing, such as a sweat suit or jogging clothes. Avoid snaps, zippers and other metal. We may ask you to undress and put on a hospital gown.  If you have any questions, please call the Imaging Department where you will have your exam.            May 08, 2018  8:30 AM CDT   Return Visit with Jonelle Eng MD   [facility] ([facility])    [facility]  [address]  [place] [address] [phone]              Future tests that were ordered for you today     Open Future Orders        Priority Expected Expires Ordered    CT Abdomen Pelvis w Contrast Routine  5/9/2018 5/9/2017            Who to contact     If you have questions or need follow up information about today's clinic visit or your schedule please contact [facility] directly at [phone].  Normal or non-critical lab and imaging results will be communicated to you by TravelAIhart, letter or phone within 4 business days after the clinic has received the results. If you do not hear from us within 7 days, please contact the clinic through OneSpott or phone. If you have a critical or abnormal lab result, we will notify you by phone as soon as possible.  Submit refill requests through Fuzmo or call your pharmacy and they will forward the refill request to us. Please allow 3 business days for your refill to be completed.          Additional Information About Your Visit        Fuzmo Information     Fuzmo lets you send messages to your doctor, view your test results, renew your prescriptions, schedule appointments and more. To sign up, go to [email or web address] . Click on \"Log in\" on the left side of the screen, which will take you to the Welcome page. Then click " "on \"Sign up Now\" on the right side of the page.     You will be asked to enter the access code listed below, as well as some personal information. Please follow the directions to create your username and password.     Your access code is: [email or web address]  Expires: 2017  6:23 PM     Your access code will  in 90 days. If you need help or a new code, please call your [place] clinic or [phone].        Care EveryWhere ID     This is your Care EveryWhere ID. This could be used by other organizations to access your [place] medical records  [id number]        Your Vitals Were     Pulse Temperature Respirations Pulse Oximetry BMI (Body Mass Index)       80 98.1  F (36.7  C) (Oral) 16 99% 30.38 kg/m2        Blood Pressure from Last 3 Encounters:   17 111/74   17 114/68   17 113/79    Weight from Last 3 Encounters:   17 90.6 kg (199 lb 12.8 oz)   17 91.2 kg (201 lb 1.6 oz)   17 88.9 kg (196 lb)                 Today's Medication Changes          These changes are accurate as of: 17 10:57 AM.  If you have any questions, ask your nurse or doctor.               These medicines have changed or have updated prescriptions.        Dose/Directions    * rivaroxaban ANTICOAGULANT 15 MG Tabs tablet   Commonly known as:  XARELTO   Indication:  Blood Clot in a Blood Vessel of the Lung   This may have changed:  Another medication with the same name was added. Make sure you understand how and when to take each.   Used for:  Bilateral pulmonary embolism (H)        Dose:  15 mg   Take 1 tablet (15 mg) by mouth 2 times daily (with meals) for 21 days   Quantity:  42 tablet   Refills:  0       * rivaroxaban ANTICOAGULANT 20 MG Tabs tablet   Commonly known as:  XARELTO   This may have changed:  You were already taking a medication with the same name, and this prescription was added. Make sure you understand how and when to take each.   Used for:  Bilateral pulmonary embolism (H)        Dose:  " 20 mg   Take 1 tablet (20 mg) by mouth daily (with dinner)   Quantity:  30 tablet   Refills:  11       * Notice:  This list has 2 medication(s) that are the same as other medications prescribed for you. Read the directions carefully, and ask your doctor or other care provider to review them with you.         Where to get your medicines      These medications were sent to [id number] - [place], [place] - [address] AT [address]  [address], [facility] [place] [address]     Phone:  [phone]     rivaroxaban ANTICOAGULANT 20 MG Tabs tablet                Primary Care Provider Office Phone # Fax #    REESE Sylvester [facility] [phone]       [facility] [place] [address] [place] [address]        Thank you!     Thank you for choosing [facility]  for your care. Our goal is always to provide you with excellent care. Hearing back from our patients is one way we can continue to improve our services. Please take a few minutes to complete the written survey that you may receive in the mail after your visit with us. Thank you!             Your Updated Medication List - Protect others around you: Learn how to safely use, store and throw away your medicines at www.disposemymeds.org.          This list is accurate as of: 5/9/17 10:57 AM.  Always use your most recent med list.                   Brand Name Dispense Instructions for use    oxyCODONE-acetaminophen 5-325 MG per tablet    PERCOCET    12 tablet    Take 1-2 tablets by mouth every 6 hours as needed for moderate to severe pain       * rivaroxaban ANTICOAGULANT 15 MG Tabs tablet    XARELTO    42 tablet    Take 1 tablet (15 mg) by mouth 2 times daily (with meals) for 21 days       * rivaroxaban ANTICOAGULANT 20 MG Tabs tablet    XARELTO    30 tablet    Take 1 tablet (20 mg) by mouth daily (with dinner)       * Notice:  This list has 2 medication(s) that are the same  as other medications prescribed for you. Read the directions carefully, and ask your doctor or other care provider to review them with you.

## 2017-05-09 NOTE — PROGRESS NOTES
"Oncology Rooming Note    May 9, 2017 10:20 AM   Leyla Hines is a 41 year old female who presents for:    Chief Complaint   Patient presents with     Oncology Clinic Visit     Pulmonary Embolism      Initial Vitals: /74 (BP Location: Left arm, Patient Position: Chair, Cuff Size: Adult Regular)  Pulse 80  Temp 98.1  F (36.7  C) (Oral)  Resp 16  Wt 90.6 kg (199 lb 12.8 oz)  SpO2 99%  BMI 30.38 kg/m2 Estimated body mass index is 30.38 kg/(m^2) as calculated from the following:    Height as of 5/4/17: 1.727 m (5' 8\").    Weight as of this encounter: 90.6 kg (199 lb 12.8 oz). Body surface area is 2.08 meters squared.  No Pain (0) Comment: Data Unavailable   No LMP recorded.  Allergies reviewed: Yes  Medications reviewed: Yes    Medications: MEDICATION REFILLS NEEDED TODAY. Provider was notified. DOSAGE CHANGE ON XARELTO  Pharmacy name entered into Sokoos: [facility] [address] [place] [address] [address] [facility] [address]    Clinical concerns: Follow-Up Pulmonary Embolism Dr. Eng was notified.    5 minutes for nursing intake (face to face time)     Arcelia Roland MA            DISCHARGE PLAN:    CT of abdomen and pelvis(will call with results) at [phone]- ok to   She is aware to continue xarelto  Escorted to  to have schedulers arrange future appointments      Next appointments: See patient instruction section  Departure Mode: Ambulatory  Accompanied by: self  5 minutes for nursing discharge (face to face time)   Jessica Miller RN      "

## 2017-05-17 NOTE — PROGRESS NOTES
HEMATOLOGY HISTORY:  Ms. Leyla Hines is a female with recurrent pulmonary embolism.   1.  CT chest angiogram on 06/27/2013 revealed right lower lobe pulmonary embolism.  Leg ultrasound was negative for DVT.  The patient was on oral contraceptive pill.  This was provoked pulmonary embolism for OCP.  She was treated with 6 months of warfarin.   2.  The patient was seen in ER on 04/23/2017 with shortness of breath.  CT chest angiogram revealed acute bilateral pulmonary emboli.  Lower extremity ultrasound on 04/24/2017 was negative for DVT.  The patient had unprovoked pulmonary embolism.  Anticoagulation was started.   3.  Multiple labs done on 04/24/2017:  -Protein C normal at 71.    -Protein S normal at 73.    -Lupus negative.    -No factor V Leiden mutation.    -No prothrombin gene mutation.   4. Colonoscopy on 12/30/2016 was normal.    5. EGD on 03/09/2017 was essentially normal.      SUBJECTIVE: Ms. Hines is a 41-year-old female with recurrent pulmonary embolism.  She was recently seen in hospital for unprovoked bilateral pulmonary embolism.  The patient is currently on Xarelto.  Lifelong anticoagulation has been recommended.      She is doing good.  Denies any headache.  No dizziness.  No neck pain.  No chest pain.  No shortness of breath.  No abdominal pain, nausea or vomiting.  No urinary or bowel complaints.  No bleeding.  Appetite has been good.      ASSESSMENT:   1.  A 41-year-old female with unprovoked bilateral pulmonary embolism diagnosed on 04/23/2017.   2.  Provoked right lung pulmonary embolism diagnosed on 06/27/2013.  It was secondary to oral contraceptive pill.      PLAN:    1. I had a long discussion with her.  Discussed regarding thrombosis.  The patient now had unprovoked bilateral pulmonary embolism. It was moderate in volume.  There was right heart strain.  Because of that, she has been recommended indefinite anticoagulation.  She is on Xarelto.  She is tolerating it well.  She is agreeable to  take it lifelong. Prescription refilled for 1 year.      2.  Different causes of thrombosis discussed.  Some of the hypercoagulable workup done have been negative.  Discussed with her regarding ruling out malignancy as the cause of thrombosis.  The patient had a colonoscopy done on 2016, which was normal.  She had EGD done on 2017, which was essentially normal.  She is going to have a mammogram done.      Discussed regarding getting a CT abdomen and pelvis.  She is agreeable for it.  An appointment will be made.  We will call her with the results.      3. I will see her in 1 year for followup.  I advised her to go to the emergency room if she has chest pain, shortness of breath, pain/swelling/redness in the extremities or any other concerns.  General ways to reduce blood clot were discussed.  I advised her to be active.  Advised her to ambulate every 1-2 hours if she goes on prolonged car or plane ride.         RANDI SANDERS MD             D: 2017 17:25   T: 2017 06:36   MT: marta      Name:     EMMA SARGENT   MRN:      [id number]        Account:      [id number]   :      1975           Visit Date:   2017      Document: [id number]

## 2017-05-25 ENCOUNTER — HOSPITAL ENCOUNTER (OUTPATIENT)
Dept: CT IMAGING | Facility: CLINIC | Age: 42
Discharge: HOME | End: 2017-05-25
Attending: INTERNAL MEDICINE | Admitting: INTERNAL MEDICINE
Payer: COMMERCIAL

## 2017-05-25 DIAGNOSIS — R10.13 ABDOMINAL PAIN, EPIGASTRIC: ICD-10-CM

## 2017-05-25 DIAGNOSIS — I26.99 BILATERAL PULMONARY EMBOLISM (H): ICD-10-CM

## 2017-05-25 PROCEDURE — 25000128 H RX IP 250 OP 636: Performed by: INTERNAL MEDICINE

## 2017-05-25 PROCEDURE — 74177 CT ABD & PELVIS W/CONTRAST: CPT

## 2017-05-25 PROCEDURE — 25000125 ZZHC RX 250: Performed by: INTERNAL MEDICINE

## 2017-05-25 RX ORDER — IOPAMIDOL 755 MG/ML
97 INJECTION, SOLUTION INTRAVASCULAR ONCE
Status: COMPLETED | OUTPATIENT
Start: 2017-05-25 | End: 2017-05-25

## 2017-05-25 RX ADMIN — SODIUM CHLORIDE 68 ML: 9 INJECTION, SOLUTION INTRAVENOUS at 10:57

## 2017-05-25 RX ADMIN — IOPAMIDOL 97 ML: 755 INJECTION, SOLUTION INTRAVENOUS at 10:57

## 2018-01-22 ENCOUNTER — TELEPHONE (OUTPATIENT)
Dept: PEDIATRICS | Facility: CLINIC | Age: 43
End: 2018-01-22

## 2018-01-22 NOTE — TELEPHONE ENCOUNTER
"Patient Communication Preferences indicate  Do not contact  and/or communication by \"Phone\" is not preferred. Call not required per Outreach team.      Outreach ,  Sravan Chowdary     "

## 2018-05-01 DIAGNOSIS — I26.99 BILATERAL PULMONARY EMBOLISM (H): ICD-10-CM

## 2018-05-08 ENCOUNTER — TELEPHONE (OUTPATIENT)
Dept: ONCOLOGY | Facility: CLINIC | Age: 43
End: 2018-05-08

## 2018-05-08 NOTE — TELEPHONE ENCOUNTER
Left message for patient to call regarding an appointment at [facility]. (Need to reschedule appointment that was for 5/08/18. [facility]

## 2019-05-04 ENCOUNTER — TRANSFERRED RECORDS (OUTPATIENT)
Dept: HEALTH INFORMATION MANAGEMENT | Facility: CLINIC | Age: 44
End: 2019-05-04

## 2019-06-09 ENCOUNTER — TRANSFERRED RECORDS (OUTPATIENT)
Dept: HEALTH INFORMATION MANAGEMENT | Facility: CLINIC | Age: 44
End: 2019-06-09

## (undated) DEVICE — ENDO FORCEP ENDOJAW BIOPSY 2.8MMX160CM FB-220K

## (undated) DEVICE — KIT ENDO TURNOVER/PROCEDURE CARRY-ON 101822

## (undated) RX ORDER — FENTANYL CITRATE 50 UG/ML
INJECTION, SOLUTION INTRAMUSCULAR; INTRAVENOUS
Status: DISPENSED
Start: 2017-03-09